# Patient Record
Sex: FEMALE | Race: BLACK OR AFRICAN AMERICAN | NOT HISPANIC OR LATINO | Employment: FULL TIME | URBAN - METROPOLITAN AREA
[De-identification: names, ages, dates, MRNs, and addresses within clinical notes are randomized per-mention and may not be internally consistent; named-entity substitution may affect disease eponyms.]

---

## 2021-03-18 ENCOUNTER — PATIENT MESSAGE (OUTPATIENT)
Dept: ADMINISTRATIVE | Facility: CLINIC | Age: 36
End: 2021-03-18

## 2021-03-30 ENCOUNTER — IMMUNIZATION (OUTPATIENT)
Dept: PRIMARY CARE CLINIC | Facility: CLINIC | Age: 36
End: 2021-03-30
Payer: MEDICAID

## 2021-03-30 DIAGNOSIS — Z23 NEED FOR VACCINATION: Primary | ICD-10-CM

## 2021-03-30 PROCEDURE — 0001A COVID-19, MRNA, LNP-S, PF, 30 MCG/0.3 ML DOSE VACCINE: CPT | Mod: CV19,S$GLB,, | Performed by: FAMILY MEDICINE

## 2021-03-30 PROCEDURE — 91300 COVID-19, MRNA, LNP-S, PF, 30 MCG/0.3 ML DOSE VACCINE: ICD-10-PCS | Mod: S$GLB,,, | Performed by: FAMILY MEDICINE

## 2021-03-30 PROCEDURE — 91300 COVID-19, MRNA, LNP-S, PF, 30 MCG/0.3 ML DOSE VACCINE: CPT | Mod: S$GLB,,, | Performed by: FAMILY MEDICINE

## 2021-03-30 PROCEDURE — 0001A COVID-19, MRNA, LNP-S, PF, 30 MCG/0.3 ML DOSE VACCINE: ICD-10-PCS | Mod: CV19,S$GLB,, | Performed by: FAMILY MEDICINE

## 2021-04-20 ENCOUNTER — IMMUNIZATION (OUTPATIENT)
Dept: PRIMARY CARE CLINIC | Facility: CLINIC | Age: 36
End: 2021-04-20

## 2021-04-20 DIAGNOSIS — Z23 NEED FOR VACCINATION: Primary | ICD-10-CM

## 2021-04-20 PROCEDURE — 91300 COVID-19, MRNA, LNP-S, PF, 30 MCG/0.3 ML DOSE VACCINE: ICD-10-PCS | Mod: S$GLB,,, | Performed by: FAMILY MEDICINE

## 2021-04-20 PROCEDURE — 91300 COVID-19, MRNA, LNP-S, PF, 30 MCG/0.3 ML DOSE VACCINE: CPT | Mod: S$GLB,,, | Performed by: FAMILY MEDICINE

## 2021-04-20 PROCEDURE — 0002A COVID-19, MRNA, LNP-S, PF, 30 MCG/0.3 ML DOSE VACCINE: ICD-10-PCS | Mod: CV19,S$GLB,, | Performed by: FAMILY MEDICINE

## 2021-04-20 PROCEDURE — 0002A COVID-19, MRNA, LNP-S, PF, 30 MCG/0.3 ML DOSE VACCINE: CPT | Mod: CV19,S$GLB,, | Performed by: FAMILY MEDICINE

## 2022-12-08 ENCOUNTER — HOSPITAL ENCOUNTER (EMERGENCY)
Facility: HOSPITAL | Age: 37
Discharge: HOME | End: 2022-12-08
Attending: EMERGENCY MEDICINE
Payer: MEDICAID

## 2022-12-08 VITALS
OXYGEN SATURATION: 99 % | BODY MASS INDEX: 19.49 KG/M2 | SYSTOLIC BLOOD PRESSURE: 142 MMHG | HEART RATE: 81 BPM | TEMPERATURE: 98 F | WEIGHT: 110 LBS | RESPIRATION RATE: 16 BRPM | HEIGHT: 63 IN | DIASTOLIC BLOOD PRESSURE: 97 MMHG

## 2022-12-08 DIAGNOSIS — K52.9 ENTERITIS: Primary | ICD-10-CM

## 2022-12-08 LAB
ALBUMIN SERPL BCP-MCNC: 4.2 G/DL (ref 3.5–5.2)
ALP SERPL-CCNC: 58 U/L (ref 55–135)
ALT SERPL W/O P-5'-P-CCNC: 14 U/L (ref 10–44)
ANION GAP SERPL CALC-SCNC: 5 MMOL/L (ref 8–16)
AST SERPL-CCNC: 20 U/L (ref 10–40)
B-HCG UR QL: NEGATIVE
BACTERIA #/AREA URNS HPF: NEGATIVE /HPF
BASOPHILS # BLD AUTO: 0.05 K/UL (ref 0–0.2)
BASOPHILS NFR BLD: 0.4 % (ref 0–1.9)
BILIRUB SERPL-MCNC: 0.7 MG/DL (ref 0.1–1)
BILIRUB UR QL STRIP: NEGATIVE
BUN SERPL-MCNC: 15 MG/DL (ref 6–20)
CALCIUM SERPL-MCNC: 9 MG/DL (ref 8.7–10.5)
CHLORIDE SERPL-SCNC: 108 MMOL/L (ref 95–110)
CLARITY UR: CLEAR
CO2 SERPL-SCNC: 25 MMOL/L (ref 23–29)
COLOR UR: YELLOW
CREAT SERPL-MCNC: 0.8 MG/DL (ref 0.5–1.4)
CTP QC/QA: YES
DIFFERENTIAL METHOD: ABNORMAL
EOSINOPHIL # BLD AUTO: 0 K/UL (ref 0–0.5)
EOSINOPHIL NFR BLD: 0.2 % (ref 0–8)
ERYTHROCYTE [DISTWIDTH] IN BLOOD BY AUTOMATED COUNT: 14.1 % (ref 11.5–14.5)
EST. GFR  (NO RACE VARIABLE): >60 ML/MIN/1.73 M^2
GLUCOSE SERPL-MCNC: 102 MG/DL (ref 70–110)
GLUCOSE UR QL STRIP: NEGATIVE
HCT VFR BLD AUTO: 39.6 % (ref 37–48.5)
HGB BLD-MCNC: 12.7 G/DL (ref 12–16)
HGB UR QL STRIP: ABNORMAL
HYALINE CASTS #/AREA URNS LPF: 23 /LPF
IMM GRANULOCYTES # BLD AUTO: 0.06 K/UL (ref 0–0.04)
IMM GRANULOCYTES NFR BLD AUTO: 0.4 % (ref 0–0.5)
KETONES UR QL STRIP: ABNORMAL
LEUKOCYTE ESTERASE UR QL STRIP: NEGATIVE
LIPASE SERPL-CCNC: 28 U/L (ref 4–60)
LYMPHOCYTES # BLD AUTO: 1.2 K/UL (ref 1–4.8)
LYMPHOCYTES NFR BLD: 8.6 % (ref 18–48)
MCH RBC QN AUTO: 28.2 PG (ref 27–31)
MCHC RBC AUTO-ENTMCNC: 32.1 G/DL (ref 32–36)
MCV RBC AUTO: 88 FL (ref 82–98)
MICROSCOPIC COMMENT: ABNORMAL
MONOCYTES # BLD AUTO: 0.8 K/UL (ref 0.3–1)
MONOCYTES NFR BLD: 5.9 % (ref 4–15)
NEUTROPHILS # BLD AUTO: 11.4 K/UL (ref 1.8–7.7)
NEUTROPHILS NFR BLD: 84.5 % (ref 38–73)
NITRITE UR QL STRIP: NEGATIVE
NRBC BLD-RTO: 0 /100 WBC
PH UR STRIP: 6 [PH] (ref 5–8)
PLATELET # BLD AUTO: 204 K/UL (ref 150–450)
PMV BLD AUTO: 9.5 FL (ref 9.2–12.9)
POTASSIUM SERPL-SCNC: 3.6 MMOL/L (ref 3.5–5.1)
PROT SERPL-MCNC: 7.6 G/DL (ref 6–8.4)
PROT UR QL STRIP: ABNORMAL
RBC # BLD AUTO: 4.5 M/UL (ref 4–5.4)
RBC #/AREA URNS HPF: 6 /HPF (ref 0–4)
SODIUM SERPL-SCNC: 138 MMOL/L (ref 136–145)
SP GR UR STRIP: 1.03 (ref 1–1.03)
SQUAMOUS #/AREA URNS HPF: 8 /HPF
URN SPEC COLLECT METH UR: ABNORMAL
UROBILINOGEN UR STRIP-ACNC: NEGATIVE EU/DL
WBC # BLD AUTO: 13.45 K/UL (ref 3.9–12.7)
WBC #/AREA URNS HPF: 4 /HPF (ref 0–5)

## 2022-12-08 PROCEDURE — 96372 THER/PROPH/DIAG INJ SC/IM: CPT | Performed by: NURSE PRACTITIONER

## 2022-12-08 PROCEDURE — 80053 COMPREHEN METABOLIC PANEL: CPT | Performed by: NURSE PRACTITIONER

## 2022-12-08 PROCEDURE — 85025 COMPLETE CBC W/AUTO DIFF WBC: CPT | Performed by: NURSE PRACTITIONER

## 2022-12-08 PROCEDURE — 83690 ASSAY OF LIPASE: CPT | Performed by: NURSE PRACTITIONER

## 2022-12-08 PROCEDURE — 63600175 PHARM REV CODE 636 W HCPCS: Performed by: NURSE PRACTITIONER

## 2022-12-08 PROCEDURE — 25000003 PHARM REV CODE 250: Performed by: NURSE PRACTITIONER

## 2022-12-08 PROCEDURE — 81001 URINALYSIS AUTO W/SCOPE: CPT | Performed by: NURSE PRACTITIONER

## 2022-12-08 PROCEDURE — 99285 EMERGENCY DEPT VISIT HI MDM: CPT | Mod: 25

## 2022-12-08 PROCEDURE — 81025 URINE PREGNANCY TEST: CPT | Performed by: NURSE PRACTITIONER

## 2022-12-08 PROCEDURE — 25500020 PHARM REV CODE 255: Performed by: EMERGENCY MEDICINE

## 2022-12-08 RX ORDER — MAG HYDROX/ALUMINUM HYD/SIMETH 200-200-20
30 SUSPENSION, ORAL (FINAL DOSE FORM) ORAL ONCE
Status: COMPLETED | OUTPATIENT
Start: 2022-12-08 | End: 2022-12-08

## 2022-12-08 RX ORDER — DICYCLOMINE HYDROCHLORIDE 20 MG/1
20 TABLET ORAL 2 TIMES DAILY PRN
Qty: 20 TABLET | Refills: 0 | Status: SHIPPED | OUTPATIENT
Start: 2022-12-08 | End: 2022-12-18

## 2022-12-08 RX ORDER — CIPROFLOXACIN 500 MG/1
500 TABLET ORAL 2 TIMES DAILY
Qty: 14 TABLET | Refills: 0 | Status: SHIPPED | OUTPATIENT
Start: 2022-12-08 | End: 2022-12-15

## 2022-12-08 RX ORDER — METRONIDAZOLE 250 MG/1
500 TABLET ORAL
Status: COMPLETED | OUTPATIENT
Start: 2022-12-08 | End: 2022-12-08

## 2022-12-08 RX ORDER — TRAMADOL HYDROCHLORIDE 50 MG/1
50 TABLET ORAL
COMMUNITY
Start: 2022-08-29

## 2022-12-08 RX ORDER — DICYCLOMINE HYDROCHLORIDE 10 MG/ML
20 INJECTION INTRAMUSCULAR
Status: COMPLETED | OUTPATIENT
Start: 2022-12-08 | End: 2022-12-08

## 2022-12-08 RX ORDER — CIPROFLOXACIN 500 MG/1
500 TABLET ORAL
Status: COMPLETED | OUTPATIENT
Start: 2022-12-08 | End: 2022-12-08

## 2022-12-08 RX ORDER — METRONIDAZOLE 500 MG/1
500 TABLET ORAL 3 TIMES DAILY
Qty: 21 TABLET | Refills: 0 | Status: SHIPPED | OUTPATIENT
Start: 2022-12-08 | End: 2022-12-15

## 2022-12-08 RX ORDER — LIDOCAINE HYDROCHLORIDE 20 MG/ML
15 SOLUTION OROPHARYNGEAL ONCE
Status: DISCONTINUED | OUTPATIENT
Start: 2022-12-08 | End: 2022-12-08 | Stop reason: HOSPADM

## 2022-12-08 RX ADMIN — ALUMINUM HYDROXIDE, MAGNESIUM HYDROXIDE, AND SIMETHICONE 30 ML: 200; 200; 20 SUSPENSION ORAL at 06:12

## 2022-12-08 RX ADMIN — CIPROFLOXACIN 500 MG: 500 TABLET, FILM COATED ORAL at 07:12

## 2022-12-08 RX ADMIN — METRONIDAZOLE 500 MG: 250 TABLET ORAL at 07:12

## 2022-12-08 RX ADMIN — DICYCLOMINE HYDROCHLORIDE 20 MG: 10 INJECTION, SOLUTION INTRAMUSCULAR at 06:12

## 2022-12-08 RX ADMIN — IOHEXOL 100 ML: 350 INJECTION, SOLUTION INTRAVENOUS at 06:12

## 2022-12-08 NOTE — ED PROVIDER NOTES
"Source of History:  Patient, chart    Chief complaint:  Abdominal Pain (Cramping and sharp pains x2 days)      HPI:  Lorena Green is a 37 y.o. female with no significant medical history presenting with generalized abdominal pain and heavy menstrual cycle.  Patient states she started her menstrual cycle 2 days ago and has been heavy and passing clots since that time.  Patient states pain initially started in lower abdomen but now radiates to whole abdomen.  Patient denies any associated nausea or vomiting.    This is the extent to the patients complaints today here in the emergency department.    ROS: As per HPI and below:  Constitutional: No fever.  No chills.  Eyes: No visual changes.  ENT: No sore throat. No ear pain    Cardiovascular: No chest pain.  Respiratory: No shortness of breath.  GI:  Positive for abdominal pain.  No nausea.  No vomiting.  Genitourinary:  Positive for vaginal bleeding.  Neurologic: No headache. No focal weakness.  No numbness.  MSK: no back pain.  Integument: No rashes or lesions.  Hematologic: No easy bruising.  Endocrine: No excessive thirst or urination.    Review of patient's allergies indicates:  No Known Allergies    PMH:  As per HPI and below:  No past medical history on file.  No past surgical history on file.    Social History     Tobacco Use    Smoking status: Never   Substance Use Topics    Alcohol use: Yes     Comment: rarely       Physical Exam:    BP (!) 142/97   Pulse 81   Temp 98.1 °F (36.7 °C) (Oral)   Resp 16   Ht 5' 3" (1.6 m)   Wt 49.9 kg (110 lb)   SpO2 99%   Breastfeeding No   BMI 19.49 kg/m²   Nursing note and vital signs reviewed.  Constitutional: No acute distress.  Nontoxic  Eyes: No conjunctival injection.  Extraocular muscles are intact.  ENT: Oropharynx clear.  Normal phonation.  Cardiovascular: Regular rate and rhythm.  No murmurs. No gallops. No rubs  Respiratory: Clear to auscultation bilaterally.  Good air movement.  No wheezes.  No rhonchi. No " rales. No accessory muscle use.  Abdomen:  Abdomen is soft and nontender.  No rebound or guarding.  Bowel sounds within normal limits.  Non peritoneal.  Musculoskeletal: Good range of motion all joints.  No deformities.  Neck supple.  No meningismus.  Skin: No rashes seen.  Good turgor.  No abrasions.  No ecchymoses.  Neuro: alert and oriented x3,  no focal neurological deficits.  Psych: Appropriate, conversant    Labs/Imaging that have been ordered have been independently reviewed and interpreted by myself.    Labs Reviewed   CBC W/ AUTO DIFFERENTIAL - Abnormal; Notable for the following components:       Result Value    WBC 13.45 (*)     Gran # (ANC) 11.4 (*)     Immature Grans (Abs) 0.06 (*)     Gran % 84.5 (*)     Lymph % 8.6 (*)     All other components within normal limits   COMPREHENSIVE METABOLIC PANEL - Abnormal; Notable for the following components:    Anion Gap 5 (*)     All other components within normal limits   URINALYSIS, REFLEX TO URINE CULTURE - Abnormal; Notable for the following components:    Protein, UA Trace (*)     Ketones, UA Trace (*)     Occult Blood UA 2+ (*)     All other components within normal limits    Narrative:     Specimen Source->Urine   URINALYSIS MICROSCOPIC - Abnormal; Notable for the following components:    RBC, UA 6 (*)     Hyaline Casts, UA 23 (*)     All other components within normal limits    Narrative:     Specimen Source->Urine   LIPASE   POCT URINE PREGNANCY     Imaging Results              CT Abdomen Pelvis With Contrast (Final result)  Result time 12/08/22 18:36:12      Final result by Brandan Krishnan MD (12/08/22 18:36:12)                   Narrative:    CT ABDOMEN AND PELVIS WITH CONTRAST    HISTORY: Abdominal pain. Comparison to June 2019.    CMS MANDATED QUALITY DATA - CT RADIATION  436    All CT scans at this facility utilize dose modulation, iterative reconstruction, and/or weight based dosing when appropriate to reduce radiation dose to as low as  reasonably achievable    FINDINGS:    Post infusion images were obtained from the lung bases to the pubic symphysis. 100 cc nonionic contrast was administered for the examination.    The lung bases are unremarkable.    There is mild hepatic steatosis. Ill-defined hypoattenuation anteriorly in the left hepatic lobe near the falciform ligament is consistent with perfusion variant, of no acute significance. The gallbladder and biliary tree are unremarkable. The spleen is normal in size and appearance. The pancreas, adrenal glands, and right kidney are normal. Subcentimeter cyst is noted at the mid pole on the left. The abdominal aorta is normal in caliber.    The appendix is visualized and is normal. There is no pathologic bowel wall thickening. No free air or free fluid is identified.    Images of the pelvis demonstrate mild wall thickening of small bowel segments, with slightly greater than normal wall enhancement. The appearance suggests mild nonspecific enteritis. The urinary bladder is unremarkable.    Multiple prominent bilateral adnexal veins are noted, as can be seen with pelvic congestion syndrome.    IMPRESSION:      1. Slight wall thickening of small bowel segments in the pelvis suggesting mild nonspecific enteritis.  2. Normal appendix.  3. Multiple mildly prominent bilateral adnexal veins are noted. This appearance can be seen with pelvic congestion syndrome.    Electronically signed by:  Brandan Krishnan MD  12/8/2022 6:36 PM CST Workstation: [id number]                                     US Pelvis Complete Non OB (Final result)  Result time 12/08/22 17:28:02      Final result by Brandan Krishnan MD (12/08/22 17:28:02)                   Narrative:    Pelvic ultrasound transabdominal    CLINICAL DATA: Negative urine pregnancy test, pelvic pain, heavy bleeding    FINDINGS: Sonographic assessment of the pelvis was performed utilizing transabdominal technique. The uterus measures 8.5 x 4.3 x 5.0 cm. The  endometrium is normal in thickness at 5 mm. No uterine masses are identified.    The bilateral ovaries are normal in appearance, with arterial flow demonstrated. There is no free fluid.    IMPRESSION:  1. Normal transabdominal pelvic ultrasound.    Electronically signed by:  Brandan Krishnan MD  12/8/2022 5:28 PM CST Workstation: [id number]                                    MDM/ Differential Dx:   Emergent evaluation of a 36 yo female presenting for heavy vaginal bleeding over the past 2 days.  Patient states she initially began with lower abdominal pain that now radiates to her upper abdomen.  Patient states she started her menstrual cycle 2 days ago and bleeding has been heavier than normal.  Patient denies any associated nausea or vomiting.  On exam pt is A&Ox3. VSS. Nonfebrile and nontoxic appearing.  Mucous membranes pink and moist.  Breath sounds clear bilaterally.  Abdomen soft and nontender. No rebound or guarding appreciated on exam.   BS WNL.  Pt speaking in full sentences.  Steady gait appreciated. Cap refill < 3 seconds.      Differential diagnoses include but are not limited to UTI, pyelonephritis, gastritis, gastroenteritis, PID, STD, ovarian cyst, GERD, others.      I will get labs, imaging and reassess.  I discussed this case with my supervising physician.    ED Course as of 12/08/22 1937   Thu Dec 08, 2022   1715 CBC unremarkable.  Slight leukocytosis noted at 13.45.  CMP unremarkable.  UA negative for any acute infectious process.  Lipase negative.  Urine preg negative.  Awaiting ultrasound. [RZ]   1758 Ultrasound negative for any acute abnormality.  Patient reassessed.  Patient states continued pain and upper epigastric region.  Will get CT to rule out any acute infectious process.  Will give GI cocktail and Bentyl to help with pain.  Awaiting imaging. [RZ]   1838 CT concerning for possible enteritis.  Will treat patient with antibiotics.  [RZ]   1936 Patient reassessed.  Patient updated on lab  and imaging results.  Advised will give antibiotics for colitis.  Advised to increase fluids and bland diet.  Strict return to ED precautions discussed.  Patient verbalized understanding of this plan of care.  All questions and concerns addressed. [RZ]   1937 Patient is hemodynamically stable, vital signs are normal. Discharge instructions given. Return to ED precautions discussed. Follow up as directed. Pt verbalized understanding of this plan.  Pt is stable for discharge. [RZ]      ED Course User Index  [RZ] Rose Marie Ash NP               Diagnostic Impression:    1. Enteritis         ED Disposition Condition    Discharge Stable            ED Prescriptions       Medication Sig Dispense Start Date End Date Auth. Provider    ciprofloxacin HCl (CIPRO) 500 MG tablet Take 1 tablet (500 mg total) by mouth 2 (two) times daily. for 7 days 14 tablet 12/8/2022 12/15/2022 Rose Marie Ash NP    metroNIDAZOLE (FLAGYL) 500 MG tablet Take 1 tablet (500 mg total) by mouth 3 (three) times daily. for 7 days 21 tablet 12/8/2022 12/15/2022 Rose Marie Ash NP    dicyclomine (BENTYL) 20 mg tablet Take 1 tablet (20 mg total) by mouth 2 (two) times daily as needed (pain). 20 tablet 12/8/2022 12/18/2022 Rose Marie Ash NP          Follow-up Information       Follow up With Specialties Details Why Contact Info    PCP  Schedule an appointment as soon as possible for a visit  As needed                Rose Marie Ash NP  12/08/22 1937

## 2022-12-09 NOTE — DISCHARGE INSTRUCTIONS
You were seen and evaluated in the ER today.  Your workup while you were here is a mild infection in your intestine.  We are going to treat you with antibiotics.  We are also going to prescribe you medication to help with pain.  Please increase fluids and bland diet.  Please follow-up with your PCP as needed.  Please return to the ED for any worsening symptoms such as chest pain, shortness of breath, fever not controlled with Tylenol or ibuprofen or uncontrolled pain.      Our goal in the emergency department is to always give you outstanding care and exceptional service. You may receive a survey by mail or e-mail in the next week regarding your experience in our ED. We would greatly appreciate your completing and returning the survey. Your feedback provides us with a way to recognize our staff who give very good care and it helps us learn how to improve when your experience was below our aspiration of excellence.

## 2023-02-27 NOTE — Clinical Note
"Lorena"Sena Green was seen and treated in our emergency department on 12/8/2022.  She may return to work on 12/10/2022.       If you have any questions or concerns, please don't hesitate to call.      Rose Marie Ash NP" Zaid Gupta (MD)  Internal Medicine  [address] [place] [address]  Phone: [phone]  Fax: [phone]  Follow Up Time: 1-3 Days

## 2023-09-19 LAB
ABO + RH BLD: NORMAL
C TRACH RRNA SPEC QL PROBE: NORMAL
GONORRHEA: NORMAL
HBV SURFACE AG SERPL QL IA: NEGATIVE
HCT VFR BLD AUTO: 31 % (ref 36–46)
HGB BLD-MCNC: 10 G/DL (ref 12–16)
HIV 1+2 AB+HIV1 P24 AG SERPL QL IA: NEGATIVE
RPR: POSITIVE
RUBELLA IMMUNE STATUS: NORMAL

## 2023-10-24 DIAGNOSIS — O30.009: Primary | ICD-10-CM

## 2023-10-24 DIAGNOSIS — O09.40: Primary | ICD-10-CM

## 2023-11-06 ENCOUNTER — HOSPITAL ENCOUNTER (OUTPATIENT)
Facility: HOSPITAL | Age: 38
Discharge: HOME | End: 2023-11-06
Attending: SPECIALIST | Admitting: SPECIALIST
Payer: MEDICAID

## 2023-11-06 VITALS — SYSTOLIC BLOOD PRESSURE: 140 MMHG | HEART RATE: 100 BPM | DIASTOLIC BLOOD PRESSURE: 96 MMHG

## 2023-11-06 DIAGNOSIS — O30.009: ICD-10-CM

## 2023-11-06 DIAGNOSIS — O09.40: ICD-10-CM

## 2023-11-06 PROCEDURE — 59025 FETAL NON-STRESS TEST: CPT

## 2023-11-06 NOTE — PROGRESS NOTES
11/06/23 1007   Non Stress Test - General   $ NST Procedure Charge Completed   Indications/Pt Reported Complaint AMA/Multiple   Test Duration (min) 30 min   Number of Fetuses 2   Nonstress Test Baby A   Variability Moderate   Decels None   Accels Present   Baseline    Interpretation Baby A   Nonstress Test Interpretation Reactive   Overall Impression Reassuring   Comments Reviewed by Dr. Larson   Nonstress Test Baby B   Variability Moderate   Decels Variable   Accels Present   Baseline    Interpretation Baby B   Nonstress Test Interpretation Reactive   Overall Impression Reassuring   Comments Reivewed by Dr. Larson     NST scheduled for Thursday. Pt was counseled to avoid coffee prior to next NST. Verbalized understanding.

## 2023-11-09 ENCOUNTER — HOSPITAL ENCOUNTER (OUTPATIENT)
Facility: HOSPITAL | Age: 38
Discharge: HOME | End: 2023-11-09
Attending: SPECIALIST | Admitting: SPECIALIST
Payer: MEDICAID

## 2023-11-09 VITALS — DIASTOLIC BLOOD PRESSURE: 77 MMHG | HEART RATE: 73 BPM | RESPIRATION RATE: 18 BRPM | SYSTOLIC BLOOD PRESSURE: 130 MMHG

## 2023-11-09 DIAGNOSIS — O30.002 TWIN GESTATION IN SECOND TRIMESTER: Primary | ICD-10-CM

## 2023-11-09 DIAGNOSIS — O30.009 PREGNANCY, TWINS: ICD-10-CM

## 2023-11-09 PROCEDURE — 59025 FETAL NON-STRESS TEST: CPT

## 2023-11-13 ENCOUNTER — HOSPITAL ENCOUNTER (OUTPATIENT)
Facility: HOSPITAL | Age: 38
Discharge: HOME | End: 2023-11-13
Attending: SPECIALIST | Admitting: SPECIALIST
Payer: MEDICAID

## 2023-11-13 VITALS — RESPIRATION RATE: 17 BRPM | SYSTOLIC BLOOD PRESSURE: 139 MMHG | HEART RATE: 81 BPM | DIASTOLIC BLOOD PRESSURE: 88 MMHG

## 2023-11-13 DIAGNOSIS — O30.002 TWIN GESTATION IN SECOND TRIMESTER: ICD-10-CM

## 2023-11-13 LAB — FIBRONECTIN FETAL SPEC QL: NEGATIVE

## 2023-11-13 PROCEDURE — 82731 ASSAY OF FETAL FIBRONECTIN: CPT | Performed by: SPECIALIST

## 2023-11-16 ENCOUNTER — HOSPITAL ENCOUNTER (OUTPATIENT)
Facility: HOSPITAL | Age: 38
Discharge: HOME | End: 2023-11-16
Attending: SPECIALIST | Admitting: SPECIALIST
Payer: MEDICAID

## 2023-11-16 VITALS — SYSTOLIC BLOOD PRESSURE: 136 MMHG | HEART RATE: 77 BPM | DIASTOLIC BLOOD PRESSURE: 85 MMHG

## 2023-11-16 DIAGNOSIS — O30.002 TWIN GESTATION IN SECOND TRIMESTER: ICD-10-CM

## 2023-11-16 LAB
ALBUMIN SERPL BCP-MCNC: 3.6 G/DL (ref 3.5–5.2)
ALP SERPL-CCNC: 109 U/L (ref 55–135)
ALT SERPL W/O P-5'-P-CCNC: 9 U/L (ref 10–44)
ANION GAP SERPL CALC-SCNC: 6 MMOL/L (ref 8–16)
AST SERPL-CCNC: 19 U/L (ref 10–40)
BASOPHILS # BLD AUTO: 0.02 K/UL (ref 0–0.2)
BASOPHILS NFR BLD: 0.3 % (ref 0–1.9)
BILIRUB SERPL-MCNC: 0.3 MG/DL (ref 0.1–1)
BILIRUB UR QL STRIP: NEGATIVE
BUN SERPL-MCNC: 6 MG/DL (ref 6–20)
CALCIUM SERPL-MCNC: 8.9 MG/DL (ref 8.7–10.5)
CHLORIDE SERPL-SCNC: 104 MMOL/L (ref 95–110)
CLARITY UR: CLEAR
CO2 SERPL-SCNC: 25 MMOL/L (ref 23–29)
COLOR UR: YELLOW
CREAT SERPL-MCNC: 0.5 MG/DL (ref 0.5–1.4)
DIFFERENTIAL METHOD: ABNORMAL
EOSINOPHIL # BLD AUTO: 0 K/UL (ref 0–0.5)
EOSINOPHIL NFR BLD: 0.6 % (ref 0–8)
ERYTHROCYTE [DISTWIDTH] IN BLOOD BY AUTOMATED COUNT: 19.4 % (ref 11.5–14.5)
EST. GFR  (NO RACE VARIABLE): >60 ML/MIN/1.73 M^2
GLUCOSE SERPL-MCNC: 95 MG/DL (ref 70–110)
GLUCOSE UR QL STRIP: NEGATIVE
HCT VFR BLD AUTO: 35.7 % (ref 37–48.5)
HGB BLD-MCNC: 11.1 G/DL (ref 12–16)
HGB UR QL STRIP: NEGATIVE
IMM GRANULOCYTES # BLD AUTO: 0.05 K/UL (ref 0–0.04)
IMM GRANULOCYTES NFR BLD AUTO: 0.7 % (ref 0–0.5)
KETONES UR QL STRIP: NEGATIVE
LEUKOCYTE ESTERASE UR QL STRIP: NEGATIVE
LYMPHOCYTES # BLD AUTO: 1.7 K/UL (ref 1–4.8)
LYMPHOCYTES NFR BLD: 24.5 % (ref 18–48)
MCH RBC QN AUTO: 28.2 PG (ref 27–31)
MCHC RBC AUTO-ENTMCNC: 31.1 G/DL (ref 32–36)
MCV RBC AUTO: 91 FL (ref 82–98)
MONOCYTES # BLD AUTO: 0.7 K/UL (ref 0.3–1)
MONOCYTES NFR BLD: 9.2 % (ref 4–15)
NEUTROPHILS # BLD AUTO: 4.6 K/UL (ref 1.8–7.7)
NEUTROPHILS NFR BLD: 64.7 % (ref 38–73)
NITRITE UR QL STRIP: NEGATIVE
NRBC BLD-RTO: 0 /100 WBC
PH UR STRIP: 6 [PH] (ref 5–8)
PLATELET # BLD AUTO: 149 K/UL (ref 150–450)
PMV BLD AUTO: 9.4 FL (ref 9.2–12.9)
POTASSIUM SERPL-SCNC: 3.8 MMOL/L (ref 3.5–5.1)
PROT SERPL-MCNC: 6.5 G/DL (ref 6–8.4)
PROT UR QL STRIP: ABNORMAL
RBC # BLD AUTO: 3.94 M/UL (ref 4–5.4)
SODIUM SERPL-SCNC: 135 MMOL/L (ref 136–145)
SP GR UR STRIP: 1.02 (ref 1–1.03)
URN SPEC COLLECT METH UR: ABNORMAL
UROBILINOGEN UR STRIP-ACNC: NEGATIVE EU/DL
WBC # BLD AUTO: 7.09 K/UL (ref 3.9–12.7)

## 2023-11-16 PROCEDURE — 85025 COMPLETE CBC W/AUTO DIFF WBC: CPT | Performed by: SPECIALIST

## 2023-11-16 PROCEDURE — 80053 COMPREHEN METABOLIC PANEL: CPT | Performed by: SPECIALIST

## 2023-11-16 PROCEDURE — 36415 COLL VENOUS BLD VENIPUNCTURE: CPT | Performed by: SPECIALIST

## 2023-11-16 PROCEDURE — 59025 FETAL NON-STRESS TEST: CPT

## 2023-11-16 PROCEDURE — 25000003 PHARM REV CODE 250: Performed by: SPECIALIST

## 2023-11-16 PROCEDURE — 81003 URINALYSIS AUTO W/O SCOPE: CPT | Performed by: SPECIALIST

## 2023-11-16 RX ORDER — ACETAMINOPHEN 325 MG/1
325 TABLET ORAL ONCE
Status: COMPLETED | OUTPATIENT
Start: 2023-11-16 | End: 2023-11-16

## 2023-11-16 RX ADMIN — ACETAMINOPHEN 325 MG: 325 TABLET ORAL at 12:11

## 2023-11-17 ENCOUNTER — HOSPITAL ENCOUNTER (OUTPATIENT)
Facility: HOSPITAL | Age: 38
Discharge: HOME | End: 2023-11-17
Attending: SPECIALIST | Admitting: GENERAL PRACTICE
Payer: MEDICAID

## 2023-11-17 VITALS
BODY MASS INDEX: 23.21 KG/M2 | RESPIRATION RATE: 18 BRPM | SYSTOLIC BLOOD PRESSURE: 130 MMHG | TEMPERATURE: 98 F | HEIGHT: 63 IN | HEART RATE: 93 BPM | DIASTOLIC BLOOD PRESSURE: 86 MMHG | WEIGHT: 131 LBS

## 2023-11-17 DIAGNOSIS — R10.9 ABDOMINAL PAIN: ICD-10-CM

## 2023-11-17 LAB
ABO + RH BLD: NORMAL
ALBUMIN SERPL BCP-MCNC: 3.6 G/DL (ref 3.5–5.2)
ALP SERPL-CCNC: 112 U/L (ref 55–135)
ALT SERPL W/O P-5'-P-CCNC: 9 U/L (ref 10–44)
ANION GAP SERPL CALC-SCNC: 10 MMOL/L (ref 8–16)
AST SERPL-CCNC: 20 U/L (ref 10–40)
BACTERIA #/AREA URNS HPF: ABNORMAL /HPF
BASOPHILS # BLD AUTO: 0.03 K/UL (ref 0–0.2)
BASOPHILS NFR BLD: 0.4 % (ref 0–1.9)
BILIRUB SERPL-MCNC: 0.3 MG/DL (ref 0.1–1)
BILIRUB UR QL STRIP: NEGATIVE
BLD GP AB SCN CELLS X3 SERPL QL: NORMAL
BUN SERPL-MCNC: 6 MG/DL (ref 6–20)
CALCIUM SERPL-MCNC: 9.1 MG/DL (ref 8.7–10.5)
CHLORIDE SERPL-SCNC: 105 MMOL/L (ref 95–110)
CLARITY UR: ABNORMAL
CLUE CELLS VAG QL WET PREP: ABNORMAL
CO2 SERPL-SCNC: 20 MMOL/L (ref 23–29)
COLOR UR: YELLOW
CREAT SERPL-MCNC: 0.5 MG/DL (ref 0.5–1.4)
CREAT UR-MCNC: 135.7 MG/DL (ref 15–325)
DIFFERENTIAL METHOD: ABNORMAL
EOSINOPHIL # BLD AUTO: 0 K/UL (ref 0–0.5)
EOSINOPHIL NFR BLD: 0.4 % (ref 0–8)
ERYTHROCYTE [DISTWIDTH] IN BLOOD BY AUTOMATED COUNT: 19.2 % (ref 11.5–14.5)
EST. GFR  (NO RACE VARIABLE): >60 ML/MIN/1.73 M^2
GLUCOSE SERPL-MCNC: 90 MG/DL (ref 70–110)
GLUCOSE UR QL STRIP: NEGATIVE
GRAM STN SPEC: NORMAL
GRAM STN SPEC: NORMAL
HCT VFR BLD AUTO: 35.7 % (ref 37–48.5)
HGB BLD-MCNC: 11.2 G/DL (ref 12–16)
HGB UR QL STRIP: NEGATIVE
HYALINE CASTS #/AREA URNS LPF: 17 /LPF
IMM GRANULOCYTES # BLD AUTO: 0.04 K/UL (ref 0–0.04)
IMM GRANULOCYTES NFR BLD AUTO: 0.5 % (ref 0–0.5)
KETONES UR QL STRIP: NEGATIVE
LEUKOCYTE ESTERASE UR QL STRIP: ABNORMAL
LYMPHOCYTES # BLD AUTO: 1.6 K/UL (ref 1–4.8)
LYMPHOCYTES NFR BLD: 21.2 % (ref 18–48)
MCH RBC QN AUTO: 28.1 PG (ref 27–31)
MCHC RBC AUTO-ENTMCNC: 31.4 G/DL (ref 32–36)
MCV RBC AUTO: 90 FL (ref 82–98)
MICROSCOPIC COMMENT: ABNORMAL
MONOCYTES # BLD AUTO: 0.7 K/UL (ref 0.3–1)
MONOCYTES NFR BLD: 9.2 % (ref 4–15)
NEUTROPHILS # BLD AUTO: 5.2 K/UL (ref 1.8–7.7)
NEUTROPHILS NFR BLD: 68.3 % (ref 38–73)
NITRITE UR QL STRIP: NEGATIVE
NRBC BLD-RTO: 0 /100 WBC
PH UR STRIP: 7 [PH] (ref 5–8)
PLATELET # BLD AUTO: 151 K/UL (ref 150–450)
PMV BLD AUTO: 9.2 FL (ref 9.2–12.9)
POTASSIUM SERPL-SCNC: 3.6 MMOL/L (ref 3.5–5.1)
PROT SERPL-MCNC: 6.6 G/DL (ref 6–8.4)
PROT UR QL STRIP: ABNORMAL
PROT UR-MCNC: 18 MG/DL (ref 6–15)
PROT/CREAT UR: 0.13 MG/G{CREAT} (ref 0–0.2)
RBC # BLD AUTO: 3.99 M/UL (ref 4–5.4)
RBC #/AREA URNS HPF: 2 /HPF (ref 0–4)
SODIUM SERPL-SCNC: 135 MMOL/L (ref 136–145)
SP GR UR STRIP: 1.02 (ref 1–1.03)
SPECIMEN SOURCE: ABNORMAL
SQUAMOUS #/AREA URNS HPF: 16 /HPF
T VAGINALIS GENITAL QL WET PREP: ABNORMAL
URN SPEC COLLECT METH UR: ABNORMAL
UROBILINOGEN UR STRIP-ACNC: NEGATIVE EU/DL
WBC # BLD AUTO: 7.64 K/UL (ref 3.9–12.7)
WBC #/AREA URNS HPF: 7 /HPF (ref 0–5)
YEAST GENITAL QL WET PREP: ABNORMAL

## 2023-11-17 PROCEDURE — 85025 COMPLETE CBC W/AUTO DIFF WBC: CPT | Performed by: GENERAL PRACTICE

## 2023-11-17 PROCEDURE — 81001 URINALYSIS AUTO W/SCOPE: CPT | Performed by: SPECIALIST

## 2023-11-17 PROCEDURE — 99215 PR OFFICE/OUTPT VISIT, EST, LEVL V, 40-54 MIN: ICD-10-PCS | Mod: ,,, | Performed by: GENERAL PRACTICE

## 2023-11-17 PROCEDURE — 87205 SMEAR GRAM STAIN: CPT | Performed by: GENERAL PRACTICE

## 2023-11-17 PROCEDURE — 86901 BLOOD TYPING SEROLOGIC RH(D): CPT | Performed by: GENERAL PRACTICE

## 2023-11-17 PROCEDURE — 36415 COLL VENOUS BLD VENIPUNCTURE: CPT | Performed by: SPECIALIST

## 2023-11-17 PROCEDURE — 82570 ASSAY OF URINE CREATININE: CPT | Performed by: SPECIALIST

## 2023-11-17 PROCEDURE — 86592 SYPHILIS TEST NON-TREP QUAL: CPT | Performed by: GENERAL PRACTICE

## 2023-11-17 PROCEDURE — 99215 OFFICE O/P EST HI 40 MIN: CPT | Mod: ,,, | Performed by: GENERAL PRACTICE

## 2023-11-17 PROCEDURE — 87210 SMEAR WET MOUNT SALINE/INK: CPT | Performed by: GENERAL PRACTICE

## 2023-11-17 PROCEDURE — 25000003 PHARM REV CODE 250: Performed by: GENERAL PRACTICE

## 2023-11-17 PROCEDURE — 80053 COMPREHEN METABOLIC PANEL: CPT | Performed by: GENERAL PRACTICE

## 2023-11-17 RX ORDER — LABETALOL HYDROCHLORIDE 5 MG/ML
80 INJECTION, SOLUTION INTRAVENOUS ONCE AS NEEDED
Status: DISCONTINUED | OUTPATIENT
Start: 2023-11-17 | End: 2023-11-17

## 2023-11-17 RX ORDER — METRONIDAZOLE 250 MG/1
500 TABLET ORAL ONCE
Status: COMPLETED | OUTPATIENT
Start: 2023-11-17 | End: 2023-11-17

## 2023-11-17 RX ORDER — FERROUS GLUCONATE 324(38)MG
225 TABLET ORAL
COMMUNITY

## 2023-11-17 RX ORDER — HYDRALAZINE HYDROCHLORIDE 20 MG/ML
10 INJECTION INTRAMUSCULAR; INTRAVENOUS ONCE AS NEEDED
Status: DISCONTINUED | OUTPATIENT
Start: 2023-11-17 | End: 2023-11-17

## 2023-11-17 RX ORDER — LABETALOL 200 MG/1
200 TABLET, FILM COATED ORAL 2 TIMES DAILY
Qty: 60 TABLET | Refills: 11 | Status: ON HOLD | OUTPATIENT
Start: 2023-11-17 | End: 2023-12-19

## 2023-11-17 RX ORDER — PANTOPRAZOLE SODIUM 40 MG/1
40 TABLET, DELAYED RELEASE ORAL DAILY
COMMUNITY

## 2023-11-17 RX ORDER — DOCUSATE SODIUM 100 MG/1
100 CAPSULE, LIQUID FILLED ORAL 2 TIMES DAILY
COMMUNITY

## 2023-11-17 RX ORDER — METOCLOPRAMIDE 10 MG/1
10 TABLET ORAL ONCE
Status: COMPLETED | OUTPATIENT
Start: 2023-11-17 | End: 2023-11-17

## 2023-11-17 RX ORDER — METOCLOPRAMIDE 10 MG/1
10 TABLET ORAL ONCE
Status: DISCONTINUED | OUTPATIENT
Start: 2023-11-17 | End: 2023-11-17

## 2023-11-17 RX ORDER — NAPROXEN SODIUM 220 MG/1
81 TABLET, FILM COATED ORAL DAILY
COMMUNITY

## 2023-11-17 RX ORDER — LABETALOL HYDROCHLORIDE 5 MG/ML
40 INJECTION, SOLUTION INTRAVENOUS ONCE AS NEEDED
Status: DISCONTINUED | OUTPATIENT
Start: 2023-11-17 | End: 2023-11-17

## 2023-11-17 RX ORDER — METRONIDAZOLE 500 MG/1
500 TABLET ORAL EVERY 12 HOURS
Qty: 13 TABLET | Refills: 0 | Status: SHIPPED | OUTPATIENT
Start: 2023-11-17

## 2023-11-17 RX ORDER — LABETALOL HYDROCHLORIDE 5 MG/ML
20 INJECTION, SOLUTION INTRAVENOUS ONCE AS NEEDED
Status: DISCONTINUED | OUTPATIENT
Start: 2023-11-17 | End: 2023-11-17

## 2023-11-17 RX ORDER — LABETALOL 200 MG/1
200 TABLET, FILM COATED ORAL ONCE
Status: COMPLETED | OUTPATIENT
Start: 2023-11-17 | End: 2023-11-17

## 2023-11-17 RX ADMIN — METOCLOPRAMIDE 10 MG: 10 TABLET ORAL at 05:11

## 2023-11-17 RX ADMIN — LABETALOL HYDROCHLORIDE 200 MG: 200 TABLET, FILM COATED ORAL at 08:11

## 2023-11-17 RX ADMIN — METRONIDAZOLE 500 MG: 250 TABLET ORAL at 08:11

## 2023-11-17 NOTE — LETTER
"Lorena"Sena Green was seen and treated in our emergency department on 11/16/2023.  She may return to work on 11/19/23    If you have any questions or concerns, please don't hesitate to call.      Louise Crowley,RNC  "

## 2023-11-17 NOTE — NURSING
On license of [facility]  Department of Obstetrics and Gynecology  Labor & Delivery Triage Assessment    PATIENT NAME: Lorena Green  MRN: [id number]  TODAY'S DATE: 2023    CHIEF COMPLAINT: Abdominal Pain      OB History    Para Term  AB Living   6 5 3 2 0 0   SAB IAB Ectopic Multiple Live Births   0 0 0 0 0      # Outcome Date GA Lbr Lamin/2nd Weight Sex Delivery Anes PTL Lv   6 Current            5 Term  40w0d  2.92 kg (6 lb 7 oz) F       4   32w0d  1.814 kg (4 lb)        3 Term  40w0d  2.835 kg (6 lb 4 oz) F       2   32w0d  2.268 kg (5 lb) F       1 Term  40w0d  2.92 kg (6 lb 7 oz) F         Past Medical History:   Diagnosis Date    Anemia     GERD (gastroesophageal reflux disease)     Hypertension      Past Surgical History:   Procedure Laterality Date    CYSTECTOMY      right breast cyst removal in  as baby         VITAL SIGNS - ABNORMAL VITALS INCLUDE TEMP >100.4,RR <12 or >26, SUSTAINED MATERNAL PULSE <60 or >120     VITAL SIGNS (Most Recent)  Temp: 98.1 °F (36.7 °C) (23 1539)  Pulse: 82 (23 1642)  Resp: 16 (23 164)  BP: (!) 150/86 (23 164)    VITAL SIGNS     abnormal  HEADACHE    yes     VOMITING   no  VISUAL DISTURBANCES  no  EPIGASTRIC PAIN        no  PROTEINURIA 2+ or MORE             no   EDEMA FACE/EXTREMITIES            no    FETAL MOVEMENT     FETAL MOVEMENT: Active  FETAL HEART RATE BASELINE =  A-140, B-150  normal  FETAL HEART RATE VARIABILITY:  Moderate  FETAL HEART RATE ACCELERATIONS FOR GESTATIONAL AGE: present  FETAL HEART RATE DECELERATIONS: none    ABDOMINAL PAIN/CRAMPING/CONTRACTIONS     Patient is complaining of abdominal pain/cramping/contractions. Contraction pattern is Irregular, less than 5 minutes apart. Contraction strength is mild. Resting tone is relaxed. Abdominal palpation is non-tender.    RUPTURE OF MEMBRANES OR LEAKING OF AMNIOTIC FLUID     Patient denies ROM or leaking of amniotic  fluid.    VAGINAL BLEEDING     Patient denies vaginal bleeding.    VAGINAL EXAM     DILATION:  0  STATION:  0  EFFACEMENT:  0  PRESENTATION:  0    VAGINAL EXAM DEFERRED DUE TO:  Not in Labor    PAIN PRESENT ON ARRIVAL     ONSET:   0900  LOCATION:  Lower abd and head  PAIN SCALE (0-10):  Head is 7  DESCRIPTION: Constant ache in head    Interventions     Reglan po for headache, po hydration, start 24hr urine, continue serial bp's      PATIENT DISPOSITION     Observing at this time      Dr. Awan  notified at 1615 of the above assessment.    Kady Delatorre, RN  [facility]  11/17/2023

## 2023-11-17 NOTE — PROGRESS NOTES
"  Lorena is a 38 y.o. yo  at 29w4d wks who presented to L&D with c/o lower pelvic and epigastric pain since this morning.  Also has a HA since this morning - took 1g tylenol at 0930 and again at 1300hrs.  Says on Monday (today is Friday) she had "fuzzy spots" in her vision.  No blind spots or scotoma.    Thinks she has a yeast infection.  No vaginal burning or itching.  No dysuria or hematuria.  No GI, URI, or febrile illness.    Active FM x 2.  Denies VB or LOF.    Says CVX on MON was thin but closed.    She does not have HTN outside of pregnancy.  Presented for OB care at 23wks and had /86.  No prior pre-eclmpsia or HTN in pregnancy.  New paternity.    OB PROBLEM LIST:  -late entry to care @ 23wks  -di/di twins  -AMA  -h/o syphilis in , treated (was during pregnancy)  -h/o IUFD @ 32wks in   -h/o PTD @ 32wks in       PMH = none  PSH = breast cyst removed as an infant  MEDS =    PNV, iron    Colace    Protonix 40 daily    ASA 81mg daily    NKDA    OB HX =   40wks    32wks    40wks    32wks IUFD,    40wks   Present  -largest baby 6lb 7oz    SOC  HX =  FOB involved, sister present and supportive tonight  Denies tob/etoh/drugs  Works as a     --------------------------------    140's / 80's mostly, also 154/106, 164/102    GEN = alert/oriented, pleasant, sitting up in bed, talking without grimace during contractions  ABD = gravid, nontender, palpates moderate during contraction   = nefg, no lesions, thick white curdy discharge, no malodor, CVX digitally long/closed/posterior    TOCO q-3 min --> spaced out with hydration  FHR reassuring x 2    CT/NG () = neg  fFN () = neg    Lab Date: 2023   WBC 8   HGB 11   HCT 36         Lab Date: 2023   Creatinine 0.5   AST 20   ALT 9      Lab Date: 2023   Spec Grav 1.020   Nitrite -   Leuk Est 1+   Ketones -   Protein trace   RBC/hpf 2   Squames 16   Other -     Spot " pr/cr ratio = 0.13    WET PREP = moderate clue cells present, no yeast/trich    GROWTH US (24 OCT / +1wks):  EFW Baby A = 859g (44%) / MVP 5.16cm / anterior placenta  EFW Baby B = 906g (51%) / MVP 4.96cm / anterior placenta  Cervix 3cm    ---------------------------------    A/P  at 29+4wks with  contractions, recent negative fFN and closed CVX.  Has BV.  BP's elevated today, intermittent severe-range.  Normal labs and HA improved with reglan.  Patient overall feels much better after hydration and reglan.  Requesting to go home.  Reassuring fetal status x 2.    -flagyl one dose tonight then outpatient Rx to complete 7d treatment for BV  -labetalol 200mg po x 1 tonight then outpatient Rx to start BID tomorrow  -d/c home tonight with 24hr urine collection  -return to L&D tomorrow for urine result and BP check  -return sooner juan CARRIZALES MD

## 2023-11-18 ENCOUNTER — HOSPITAL ENCOUNTER (OUTPATIENT)
Facility: HOSPITAL | Age: 38
Discharge: HOME | End: 2023-11-18
Attending: GENERAL PRACTICE | Admitting: SPECIALIST
Payer: MEDICAID

## 2023-11-18 VITALS — SYSTOLIC BLOOD PRESSURE: 145 MMHG | RESPIRATION RATE: 20 BRPM | HEART RATE: 80 BPM | DIASTOLIC BLOOD PRESSURE: 96 MMHG

## 2023-11-18 DIAGNOSIS — O16.9 HYPERTENSION AFFECTING PREGNANCY: ICD-10-CM

## 2023-11-18 LAB
PROT 24H UR-MRATE: 166 MG/SPEC (ref 6–100)
PROT UR-MCNC: 13 MG/DL (ref 0–15)
RPR SER QL: NORMAL
URINE COLLECTION DURATION: 24 HR
URINE VOLUME: 1280 ML

## 2023-11-18 PROCEDURE — 84156 ASSAY OF PROTEIN URINE: CPT | Performed by: GENERAL PRACTICE

## 2023-11-18 NOTE — NURSING
2045 pt discharged home ambulatory in stable condition.   Patient wants to know if something can be called in for a yeast infection? Marcia in [place].

## 2023-11-18 NOTE — DISCHARGE INSTRUCTIONS
Return to hospital if decreased fetal movement, loss of fluid; vaginal bleeding or contractions more than 5-6 in an hour unrelieved by rest and hydration.   and take Labetalol and Flagyl as directed.  Continue 24 hour urine until 3:30 pm on 11/18/23 keep urine jug on ice or in the refrigerator.   Return to labor and delivery on 11/18/23  with completed 24 hour urine and for blood pressure check and fetal monitoring.

## 2023-11-18 NOTE — NURSING
1911 pt sitting up in bed no acute distress noted.  Pt states +FM; denies loss of fluid or vaginal bleeding; pt states having some mild contractions but are not as bad as earlier.  Pt denies HA; blurred vision, chest pain; epigastric pain or SOB at this time.  Assessment done per flow.  Breath sounds clear bilaterally; apical pulse strong and regular; bowel sounds active X 4 quadrants.  Pt AAO x3 call bell in reach.  IV to LH infusing LR to gravity without difficulty, site without redness or swelling.  Plan of care discussed with pt. Pt voiced understanding and consent.

## 2023-11-18 NOTE — NURSING
1611 Patient arrived to drop of 24 hour urine collection. Per MD order, placed patient on BP to cycle every 10 minutes for monitoring while waiting for urine results. Patient states she has not started Labetalol due to pharmacy not having medication for her to fill. Patient states no consistent headache, visual disturbances, epigastric pain or further complaints at this time.    1800 Provided MD with results of 24hr urine collection. MD states patient can be discharged from labor and delivery at this time. Per MD, Labetalol 200mg BID and Flagyl 500 BID x7 days to be called in for patient. Patient instructed to begin BP medication as soon as possible. Patient states understanding of importance of BP medication.

## 2023-11-20 ENCOUNTER — HOSPITAL ENCOUNTER (OUTPATIENT)
Facility: HOSPITAL | Age: 38
Discharge: HOME | End: 2023-11-21
Attending: SPECIALIST | Admitting: SPECIALIST
Payer: MEDICAID

## 2023-11-20 DIAGNOSIS — O30.002 TWIN GESTATION IN SECOND TRIMESTER: ICD-10-CM

## 2023-11-20 DIAGNOSIS — O13.3 GESTATIONAL HYPERTENSION, THIRD TRIMESTER: Primary | ICD-10-CM

## 2023-11-20 LAB
ALBUMIN SERPL BCP-MCNC: 3.1 G/DL (ref 3.5–5.2)
ALP SERPL-CCNC: 95 U/L (ref 55–135)
ALT SERPL W/O P-5'-P-CCNC: 8 U/L (ref 10–44)
ANION GAP SERPL CALC-SCNC: 5 MMOL/L (ref 8–16)
AST SERPL-CCNC: 17 U/L (ref 10–40)
BASOPHILS # BLD AUTO: 0.03 K/UL (ref 0–0.2)
BASOPHILS NFR BLD: 0.4 % (ref 0–1.9)
BILIRUB SERPL-MCNC: 0.2 MG/DL (ref 0.1–1)
BUN SERPL-MCNC: 5 MG/DL (ref 6–20)
CALCIUM SERPL-MCNC: 8.4 MG/DL (ref 8.7–10.5)
CHLORIDE SERPL-SCNC: 106 MMOL/L (ref 95–110)
CO2 SERPL-SCNC: 24 MMOL/L (ref 23–29)
CREAT SERPL-MCNC: 0.6 MG/DL (ref 0.5–1.4)
CTP QC/QA: YES
DIFFERENTIAL METHOD: ABNORMAL
EOSINOPHIL # BLD AUTO: 0.1 K/UL (ref 0–0.5)
EOSINOPHIL NFR BLD: 0.8 % (ref 0–8)
ERYTHROCYTE [DISTWIDTH] IN BLOOD BY AUTOMATED COUNT: 20.1 % (ref 11.5–14.5)
EST. GFR  (NO RACE VARIABLE): >60 ML/MIN/1.73 M^2
FIBRONECTIN FETAL SPEC QL: NEGATIVE
GLUCOSE SERPL-MCNC: 107 MG/DL (ref 70–110)
HCT VFR BLD AUTO: 32.1 % (ref 37–48.5)
HGB BLD-MCNC: 9.8 G/DL (ref 12–16)
IMM GRANULOCYTES # BLD AUTO: 0.08 K/UL (ref 0–0.04)
IMM GRANULOCYTES NFR BLD AUTO: 1.1 % (ref 0–0.5)
LYMPHOCYTES # BLD AUTO: 1.5 K/UL (ref 1–4.8)
LYMPHOCYTES NFR BLD: 21.2 % (ref 18–48)
MCH RBC QN AUTO: 27.9 PG (ref 27–31)
MCHC RBC AUTO-ENTMCNC: 30.5 G/DL (ref 32–36)
MCV RBC AUTO: 92 FL (ref 82–98)
MONOCYTES # BLD AUTO: 0.8 K/UL (ref 0.3–1)
MONOCYTES NFR BLD: 10.5 % (ref 4–15)
NEUTROPHILS # BLD AUTO: 4.8 K/UL (ref 1.8–7.7)
NEUTROPHILS NFR BLD: 66 % (ref 38–73)
NRBC BLD-RTO: 0 /100 WBC
PLATELET # BLD AUTO: 147 K/UL (ref 150–450)
PMV BLD AUTO: 9.2 FL (ref 9.2–12.9)
POC PH, VAGINAL: NEGATIVE (ref 4.5–5.5)
POTASSIUM SERPL-SCNC: 4.1 MMOL/L (ref 3.5–5.1)
PROT SERPL-MCNC: 5.6 G/DL (ref 6–8.4)
RBC # BLD AUTO: 3.51 M/UL (ref 4–5.4)
RUPTURE OF MEMBRANE: NEGATIVE
SODIUM SERPL-SCNC: 135 MMOL/L (ref 136–145)
WBC # BLD AUTO: 7.22 K/UL (ref 3.9–12.7)

## 2023-11-20 PROCEDURE — 82731 ASSAY OF FETAL FIBRONECTIN: CPT | Performed by: SPECIALIST

## 2023-11-20 PROCEDURE — 63600175 PHARM REV CODE 636 W HCPCS: Performed by: SPECIALIST

## 2023-11-20 PROCEDURE — 25000003 PHARM REV CODE 250: Performed by: SPECIALIST

## 2023-11-20 PROCEDURE — 80053 COMPREHEN METABOLIC PANEL: CPT | Performed by: SPECIALIST

## 2023-11-20 PROCEDURE — 96372 THER/PROPH/DIAG INJ SC/IM: CPT | Performed by: SPECIALIST

## 2023-11-20 PROCEDURE — 99999 HC NO LEVEL OF SERVICE - ED ONLY: CPT

## 2023-11-20 PROCEDURE — G0378 HOSPITAL OBSERVATION PER HR: HCPCS

## 2023-11-20 PROCEDURE — 59025 FETAL NON-STRESS TEST: CPT

## 2023-11-20 PROCEDURE — 36415 COLL VENOUS BLD VENIPUNCTURE: CPT | Performed by: SPECIALIST

## 2023-11-20 PROCEDURE — 85025 COMPLETE CBC W/AUTO DIFF WBC: CPT | Performed by: SPECIALIST

## 2023-11-20 RX ORDER — BETAMETHASONE SODIUM PHOSPHATE AND BETAMETHASONE ACETATE 3; 3 MG/ML; MG/ML
12 INJECTION, SUSPENSION INTRA-ARTICULAR; INTRALESIONAL; INTRAMUSCULAR; SOFT TISSUE ONCE
Status: DISCONTINUED | OUTPATIENT
Start: 2023-11-21 | End: 2023-11-21 | Stop reason: HOSPADM

## 2023-11-20 RX ORDER — SODIUM CHLORIDE, SODIUM LACTATE, POTASSIUM CHLORIDE, CALCIUM CHLORIDE 600; 310; 30; 20 MG/100ML; MG/100ML; MG/100ML; MG/100ML
INJECTION, SOLUTION INTRAVENOUS CONTINUOUS
Status: DISCONTINUED | OUTPATIENT
Start: 2023-11-20 | End: 2023-11-21 | Stop reason: HOSPADM

## 2023-11-20 RX ORDER — NIFEDIPINE 30 MG/1
30 TABLET, EXTENDED RELEASE ORAL DAILY
Status: DISCONTINUED | OUTPATIENT
Start: 2023-11-20 | End: 2023-11-21 | Stop reason: HOSPADM

## 2023-11-20 RX ORDER — BETAMETHASONE SODIUM PHOSPHATE AND BETAMETHASONE ACETATE 3; 3 MG/ML; MG/ML
12 INJECTION, SUSPENSION INTRA-ARTICULAR; INTRALESIONAL; INTRAMUSCULAR; SOFT TISSUE ONCE
Status: COMPLETED | OUTPATIENT
Start: 2023-11-20 | End: 2023-11-20

## 2023-11-20 RX ORDER — LABETALOL 200 MG/1
200 TABLET, FILM COATED ORAL 3 TIMES DAILY
Status: DISCONTINUED | OUTPATIENT
Start: 2023-11-20 | End: 2023-11-21 | Stop reason: HOSPADM

## 2023-11-20 RX ORDER — TERBUTALINE SULFATE 1 MG/ML
0.25 INJECTION SUBCUTANEOUS ONCE AS NEEDED
Status: COMPLETED | OUTPATIENT
Start: 2023-11-20 | End: 2023-11-20

## 2023-11-20 RX ORDER — ACETAMINOPHEN 500 MG
1000 TABLET ORAL ONCE
Status: COMPLETED | OUTPATIENT
Start: 2023-11-20 | End: 2023-11-20

## 2023-11-20 RX ADMIN — ACETAMINOPHEN 1000 MG: 500 TABLET ORAL at 10:11

## 2023-11-20 RX ADMIN — SODIUM CHLORIDE, SODIUM LACTATE, POTASSIUM CHLORIDE, AND CALCIUM CHLORIDE: .6; .31; .03; .02 INJECTION, SOLUTION INTRAVENOUS at 07:11

## 2023-11-20 RX ADMIN — TERBUTALINE SULFATE 0.25 MG: 1 INJECTION, SOLUTION SUBCUTANEOUS at 07:11

## 2023-11-20 RX ADMIN — SODIUM CHLORIDE, SODIUM LACTATE, POTASSIUM CHLORIDE, AND CALCIUM CHLORIDE: .6; .31; .03; .02 INJECTION, SOLUTION INTRAVENOUS at 11:11

## 2023-11-20 RX ADMIN — BETAMETHASONE ACETATE AND BETAMETHASONE SODIUM PHOSPHATE 12 MG: 3; 3 INJECTION, SUSPENSION INTRA-ARTICULAR; INTRALESIONAL; INTRAMUSCULAR; SOFT TISSUE at 01:11

## 2023-11-20 RX ADMIN — NIFEDIPINE 30 MG: 30 TABLET, FILM COATED, EXTENDED RELEASE ORAL at 12:11

## 2023-11-20 RX ADMIN — LABETALOL HYDROCHLORIDE 200 MG: 200 TABLET, FILM COATED ORAL at 08:11

## 2023-11-20 RX ADMIN — LABETALOL HYDROCHLORIDE 200 MG: 200 TABLET, FILM COATED ORAL at 03:11

## 2023-11-20 NOTE — PROGRESS NOTES
[facility]  Obstetrics  Antepartum Progress Note    Patient Name: Lorena Green  MRN: [id number]  Admission Date: 2023  Hospital Length of Stay: 0 days  Attending Physician: Ailyn Ji MD  Primary Care Provider: No, Primary Doctor    Subjective:     Principal Problem:Gestational hypertension, third trimester    HPI:  No notes on file    Hospital Course:  No notes on file    Obstetric HPI:  38-year-old  6 para 2004 at 30 weeks with twin gestation.  Patient has been followed over the last couple weeks with elevated blood pressures.  She was started over the weekend on labetalol 200 mg 3 times a day and reports has been at home at bedrest and taking it.  Patient presents today for her nonstress test but had a headache.    Patient has pertinent past prenatal history with a  delivery at 32 weeks and a previous intrauterine fetal demise at 32 weeks       Objective:     Vital Signs (Most Recent):  Pulse: 79 (23 1254)  Resp: 16 (23 1254)  BP: (!) 140/93 (23 1254) Vital Signs (24h Range):  Pulse:  [79-93] 79  Resp:  [16-18] 16  BP: (138-151)/() 140/93        There is no height or weight on file to calculate BMI.    FHTs:  A and B twin gestation fetal heart tones are reactive and reassuring no significant decelerations  TOCO:  Episodic contractions  No intake or output data in the 24 hours ending 23 1441    Cervical Exam:  Deferred today  No significant contractions     Significant Labs:  Recent Lab Results         23  1216        Albumin 3.1       ALP 95       ALT 8       Anion Gap 5       AST 17       Baso # 0.03       Basophil % 0.4       BILIRUBIN TOTAL 0.2  Comment: For infants and newborns, interpretation of results should be based  on gestational age, weight and in agreement with clinical  observations.    Premature Infant recommended reference ranges:  Up to 24 hours.............<8.0 mg/dL  Up to 48 hours............<12.0 mg/dL  3-5  days..................<15.0 mg/dL  6-29 days.................<15.0 mg/dL         BUN 5       Calcium 8.4       Chloride 106       CO2 24       Creatinine 0.6       Differential Method Automated       eGFR >60.0       Eos # 0.1       Eosinophil % 0.8       Glucose 107       Gran # (ANC) 4.8       Gran % 66.0       Hematocrit 32.1       Hemoglobin 9.8       Immature Grans (Abs) 0.08  Comment: Mild elevation in immature granulocytes is non specific and   can be seen in a variety of conditions including stress response,   acute inflammation, trauma and pregnancy. Correlation with other   laboratory and clinical findings is essential.         Immature Granulocytes 1.1       Lymph # 1.5       Lymph % 21.2       MCH 27.9       MCHC 30.5       MCV 92       Mono # 0.8       Mono % 10.5       MPV 9.2       nRBC 0       Platelet Count 147       Potassium 4.1       PROTEIN TOTAL 5.6       RBC 3.51       RDW 20.1       Sodium 135       WBC 7.22               Physical Exam  Patient just ate and says headache is much better   Most recent blood pressure 139/89  Extremities no edema DTRs not brisk 1+    Review of Systems  Assessment/Plan:     38 y.o. female  at 30w0d for:    * Gestational hypertension, third trimester  Intrauterine pregnancy twin gestation 30 week gestation  Hypertension has developed over the last several weeks.  Patient is now on labetalol 200 mg 3 times a day we will continue that dose.  I added Procardia XL 30 mg.  We will continue that daily.  24 hour urine over the weekend was protein of 166.  Have discussed with the patient she will most likely remain on bedrest and will have to watch her closely over the pregnancy.  She is her ultrasound scheduled in my office on Wednesday we will likely keep that appointment.  Patient received 1st dose of Celestone for fetal lung maturity and will give that next dose tomorrow.            Ailyn Ji MD  Obstetrics  [facility]

## 2023-11-20 NOTE — ASSESSMENT & PLAN NOTE
Intrauterine pregnancy twin gestation 30 week gestation  Hypertension has developed over the last several weeks.  Patient is now on labetalol 200 mg 3 times a day we will continue that dose.  I added Procardia XL 30 mg.  We will continue that daily.  24 hour urine over the weekend was protein of 166.  Have discussed with the patient she will most likely remain on bedrest and will have to watch her closely over the pregnancy.  She is her ultrasound scheduled in my office on Wednesday we will likely keep that appointment.  Patient received 1st dose of Celestone for fetal lung maturity and will give that next dose tomorrow.

## 2023-11-20 NOTE — SUBJECTIVE & OBJECTIVE
Obstetric HPI:  38-year-old  6 para 2004 at 30 weeks with twin gestation.  Patient has been followed over the last couple weeks with elevated blood pressures.  She was started over the weekend on labetalol 200 mg 3 times a day and reports has been at home at bedrest and taking it.  Patient presents today for her nonstress test but had a headache.    Patient has pertinent past prenatal history with a  delivery at 32 weeks and a previous intrauterine fetal demise at 32 weeks       Objective:     Vital Signs (Most Recent):  Pulse: 79 (23 1254)  Resp: 16 (23 1254)  BP: (!) 140/93 (23 1254) Vital Signs (24h Range):  Pulse:  [79-93] 79  Resp:  [16-18] 16  BP: (138-151)/() 140/93        There is no height or weight on file to calculate BMI.    FHTs:  A and B twin gestation fetal heart tones are reactive and reassuring no significant decelerations  TOCO:  Episodic contractions  No intake or output data in the 24 hours ending 23 1441    Cervical Exam:  Deferred today  No significant contractions     Significant Labs:  Recent Lab Results         23  1216        Albumin 3.1       ALP 95       ALT 8       Anion Gap 5       AST 17       Baso # 0.03       Basophil % 0.4       BILIRUBIN TOTAL 0.2  Comment: For infants and newborns, interpretation of results should be based  on gestational age, weight and in agreement with clinical  observations.    Premature Infant recommended reference ranges:  Up to 24 hours.............<8.0 mg/dL  Up to 48 hours............<12.0 mg/dL  3-5 days..................<15.0 mg/dL  6-29 days.................<15.0 mg/dL         BUN 5       Calcium 8.4       Chloride 106       CO2 24       Creatinine 0.6       Differential Method Automated       eGFR >60.0       Eos # 0.1       Eosinophil % 0.8       Glucose 107       Gran # (ANC) 4.8       Gran % 66.0       Hematocrit 32.1       Hemoglobin 9.8       Immature Grans (Abs) 0.08  Comment: Mild  elevation in immature granulocytes is non specific and   can be seen in a variety of conditions including stress response,   acute inflammation, trauma and pregnancy. Correlation with other   laboratory and clinical findings is essential.         Immature Granulocytes 1.1       Lymph # 1.5       Lymph % 21.2       MCH 27.9       MCHC 30.5       MCV 92       Mono # 0.8       Mono % 10.5       MPV 9.2       nRBC 0       Platelet Count 147       Potassium 4.1       PROTEIN TOTAL 5.6       RBC 3.51       RDW 20.1       Sodium 135       WBC 7.22               Physical Exam  Patient just ate and says headache is much better   Most recent blood pressure 139/89  Extremities no edema DTRs not brisk 1+    Review of Systems

## 2023-11-20 NOTE — PLAN OF CARE
[facility]  Initial Discharge Assessment       Primary Care Provider: No, Primary Doctor    Admission Diagnosis: Twin gestation in second trimester [O30.002]    Admission Date: 11/20/2023  Expected Discharge Date:     Pt is a 38-year-old female/male who arrived from home with Gestational hypertension, third trimester. Information verified as correct on facesheet. The assessment was completed at the patient's bedside.  Pt lives alone. Pt does not have a Living Will or Advance Directive. The Pt is oriented to person, places, and times. PCP last seen two weeks ago.  Pt denies Coumadin, dialysis, DME, HH, and has not been readmitted into the hospital in the last thirty days.  Pt is capable of performing ADLs without assistance. Pt drivers to and from medical appointments. Pt reports she takes medication as prescribed; pharmacy used [facility].  Pt verbalized plan to discharge home via private vehicle. Pt has no other needs to be addressed at this time. CM reviewed the chart and will continue to monitor.       Transition of Care Barriers: None    Payor: MEDICAID / Plan: [place] [employer] / Product Type: Managed Medicaid /     Extended Emergency Contact Information  Primary Emergency Contact: Milagro Feliz   [facility]  Home Phone: [phone]  Relation: Friend    Discharge Plan A: Home  Discharge Plan B: Home with family      [facility] - [place] - [address]  [address] [address]  Phone: [phone] Fax: [phone]      Initial Assessment (most recent)       Adult Discharge Assessment - 11/20/23 1525          Discharge Assessment    Assessment Type Discharge Planning Assessment     Confirmed/corrected address, phone number and insurance Yes     Confirmed Demographics Correct on Facesheet     Source of Information patient     When was your last doctors appointment? --   Two weeks ago    Does patient/caregiver understand observation status Yes      Communicated XU with patient/caregiver Date not available/Unable to determine     Reason For Admission Gestational hypertension, third trimester     People in Home alone     Facility Arrived From: home     Do you expect to return to your current living situation? Yes     Do you have help at home or someone to help you manage your care at home? No     Prior to hospitilization cognitive status: Alert/Oriented     Current cognitive status: Alert/Oriented     Equipment Currently Used at Home none     Readmission within 30 days? Yes     Patient currently being followed by outpatient case management? No     Do you currently have service(s) that help you manage your care at home? No     Do you take prescription medications? Yes     Do you have prescription coverage? Yes     Coverage Payor: MEDICAID - [facility] CONNECT     Do you have any problems affording any of your prescribed medications? No     Is the patient taking medications as prescribed? yes     Who is going to help you get home at discharge? Private vehicle     How do you get to doctors appointments? car, drives self     Are you on dialysis? No     Do you take coumadin? No     DME Needed Upon Discharge  none     Discharge Plan discussed with: Patient     Transition of Care Barriers None     Discharge Plan A Home     Discharge Plan B Home with family

## 2023-11-20 NOTE — NURSING
1200- spoke with dr Ji who reviewed pts strip and Bps, new order for CBC/CMP and procardia 30mgXL now X 1, pt took own tylenol for HA 7/10 without visual disturbances or RUQ pain. MD aware.    1256- MD reviewed labs & latest BPS, pt without relief of HA with tylenol, still c/o 7/10 HA without visual disturbances or RUQ pain, new orders to admit for 23hr obs, hourly VS now with every2 hours overnight, celestone 12mg IM now and repeat in 24 hours, heplock IV and she can eat. Report and orders given to VIDHI Womack. Pt ambulatory to rm [id number] at 1315 without difficulty, NAD noted.

## 2023-11-21 VITALS
DIASTOLIC BLOOD PRESSURE: 87 MMHG | SYSTOLIC BLOOD PRESSURE: 136 MMHG | RESPIRATION RATE: 17 BRPM | HEART RATE: 96 BPM | OXYGEN SATURATION: 100 % | TEMPERATURE: 98 F

## 2023-11-21 PROCEDURE — 96372 THER/PROPH/DIAG INJ SC/IM: CPT | Performed by: SPECIALIST

## 2023-11-21 PROCEDURE — 25000003 PHARM REV CODE 250: Performed by: SPECIALIST

## 2023-11-21 PROCEDURE — G0378 HOSPITAL OBSERVATION PER HR: HCPCS

## 2023-11-21 PROCEDURE — 63600175 PHARM REV CODE 636 W HCPCS: Performed by: SPECIALIST

## 2023-11-21 RX ORDER — FAMOTIDINE 10 MG/ML
20 INJECTION INTRAVENOUS 2 TIMES DAILY
Status: DISCONTINUED | OUTPATIENT
Start: 2023-11-21 | End: 2023-11-21 | Stop reason: HOSPADM

## 2023-11-21 RX ORDER — BUTALBITAL, ACETAMINOPHEN AND CAFFEINE 50; 325; 40 MG/1; MG/1; MG/1
1 TABLET ORAL EVERY 6 HOURS PRN
Status: DISCONTINUED | OUTPATIENT
Start: 2023-11-21 | End: 2023-11-21 | Stop reason: HOSPADM

## 2023-11-21 RX ORDER — BETAMETHASONE SODIUM PHOSPHATE AND BETAMETHASONE ACETATE 3; 3 MG/ML; MG/ML
12 INJECTION, SUSPENSION INTRA-ARTICULAR; INTRALESIONAL; INTRAMUSCULAR; SOFT TISSUE ONCE
Status: COMPLETED | OUTPATIENT
Start: 2023-11-21 | End: 2023-11-21

## 2023-11-21 RX ORDER — NIFEDIPINE 30 MG/1
30 TABLET, EXTENDED RELEASE ORAL DAILY
Qty: 30 TABLET | Refills: 11 | Status: ON HOLD | OUTPATIENT
Start: 2023-11-21 | End: 2023-12-19

## 2023-11-21 RX ADMIN — BETAMETHASONE ACETATE AND BETAMETHASONE SODIUM PHOSPHATE 12 MG: 3; 3 INJECTION, SUSPENSION INTRA-ARTICULAR; INTRALESIONAL; INTRAMUSCULAR; SOFT TISSUE at 11:11

## 2023-11-21 RX ADMIN — LABETALOL HYDROCHLORIDE 200 MG: 200 TABLET, FILM COATED ORAL at 09:11

## 2023-11-21 RX ADMIN — BUTALBITAL, ACETAMINOPHEN AND CAFFEINE 1 TABLET: 325; 50; 40 TABLET ORAL at 02:11

## 2023-11-21 RX ADMIN — NIFEDIPINE 30 MG: 30 TABLET, FILM COATED, EXTENDED RELEASE ORAL at 09:11

## 2023-11-21 RX ADMIN — FAMOTIDINE 20 MG: 10 INJECTION, SOLUTION INTRAVENOUS at 09:11

## 2023-11-21 NOTE — PLAN OF CARE
11/21/23 1116   Final Note   Assessment Type Final Discharge Note   Anticipated Discharge Disposition Home   What phone number can be called within the next 1-3 days to see how you are doing after discharge? [phone]   Post-Acute Status   Discharge Delays None known at this time     Discharge orders and chart reviewed with no further post-acute discharge needs identified at this time.  At this time, patient is cleared for discharge from Case Management standpoint.

## 2023-11-21 NOTE — ASSESSMENT & PLAN NOTE
Intrauterine pregnancy twin gestation 30 week gestation  Hypertension has developed over the last several weeks.  Patient is now on labetalol 200 mg 3 times a day we will continue that dose.  I added Procardia XL 30 mg.  We will continue that daily.  24 hour urine over the weekend was protein of 166.  Have discussed with the patient she will most likely remain on bedrest and will have to watch her closely over the pregnancy.  She is her ultrasound scheduled in my office on Wednesday we will likely keep that appointment.  Patient received 1st dose of Celestone for fetal lung maturity and will give that next dose tomorrow.        Episodic contractions.  I did check the patient's cervix and I found her to be 1-1/2 cm 50% effaced -3 station.  We will get an ultrasound to confirm position of the fetus a and fetus B. currently it does not appear that she is fredy.  If she does contract we will start with terbutaline and see if we can settle these out.  The cervix however does appear to be a change from her last cervical check over the weekend and last week.  She is already receiving Celestone.  If she does appear to be progressing into labor we will start antibiotics and magnesium for fetal benefits.

## 2023-11-21 NOTE — PROGRESS NOTES
[facility]  Obstetrics  Antepartum Progress Note    Patient Name: Lorena Green  MRN: [id number]  Admission Date: 11/20/2023  Hospital Length of Stay: 0 days  Attending Physician: Ailyn Ji MD  Primary Care Provider: No, Primary Doctor    Subjective:     Principal Problem:Gestational hypertension, third trimester    HPI:  No notes on file    Hospital Course:  No notes on file    Obstetric HPI:  Patient has noted contractions.  She was having contractions also over the weekend.  No vaginal bleeding no leaking of fluid.  Patient does not appear to be having contractions now.  Objective:     Vital Signs (Most Recent):  Temp: 98.1 °F (36.7 °C) (11/20/23 1840)  Pulse: 86 (11/20/23 1757)  Resp: 18 (11/20/23 1757)  BP: (!) 138/91 (11/20/23 1757)  SpO2: 100 % (11/20/23 1513) Vital Signs (24h Range):  Temp:  [98 °F (36.7 °C)-98.1 °F (36.7 °C)] 98.1 °F (36.7 °C)  Pulse:  [] 86  Resp:  [16-18] 18  SpO2:  [99 %-100 %] 100 %  BP: (111-160)/() 138/91        There is no height or weight on file to calculate BMI.    FHT:  Reactive for twin a and B  TOCO:  Currently no contractions on the tocometer.    No intake or output data in the 24 hours ending 11/20/23 1928    Cervical Exam:  Dilation:  1.5  Effacement:  50%  Station: -3  Presentation: Vertex     Significant Labs:  Recent Lab Results         11/20/23  1550   11/20/23  1540   11/20/23  1216        POC pH, Vaginal   negative         Rupture of Membrane   Negative         Albumin     3.1       ALP     95       ALT     8       Anion Gap     5       AST     17       Baso #     0.03       Basophil %     0.4       BILIRUBIN TOTAL     0.2  Comment: For infants and newborns, interpretation of results should be based  on gestational age, weight and in agreement with clinical  observations.    Premature Infant recommended reference ranges:  Up to 24 hours.............<8.0 mg/dL  Up to 48 hours............<12.0 mg/dL  3-5 days..................<15.0  mg/dL  6-29 days.................<15.0 mg/dL         BUN     5       Calcium     8.4       Chloride     106       CO2     24       Creatinine     0.6       Differential Method     Automated       eGFR     >60.0       Eos #     0.1       Eosinophil %     0.8       Fetal Fibronectin Negative           Glucose     107       Gran # (ANC)     4.8       Gran %     66.0       Hematocrit     32.1       Hemoglobin     9.8       Immature Grans (Abs)     0.08  Comment: Mild elevation in immature granulocytes is non specific and   can be seen in a variety of conditions including stress response,   acute inflammation, trauma and pregnancy. Correlation with other   laboratory and clinical findings is essential.         Immature Granulocytes     1.1       Lymph #     1.5       Lymph %     21.2       MCH     27.9       MCHC     30.5       MCV     92       Mono #     0.8       Mono %     10.5       MPV     9.2       nRBC     0       Platelet Count     147       Potassium     4.1       PROTEIN TOTAL     5.6        Acceptable   Yes         RBC     3.51       RDW     20.1       Sodium     135       WBC     7.22               Physical Exam    Review of Systems  Assessment/Plan:     38 y.o. female  at 30w0d for:    * Gestational hypertension, third trimester  Intrauterine pregnancy twin gestation 30 week gestation  Hypertension has developed over the last several weeks.  Patient is now on labetalol 200 mg 3 times a day we will continue that dose.  I added Procardia XL 30 mg.  We will continue that daily.  24 hour urine over the weekend was protein of 166.  Have discussed with the patient she will most likely remain on bedrest and will have to watch her closely over the pregnancy.  She is her ultrasound scheduled in my office on Wednesday we will likely keep that appointment.  Patient received 1st dose of Celestone for fetal lung maturity and will give that next dose tomorrow.        Episodic contractions.  I did  check the patient's cervix and I found her to be 1-1/2 cm 50% effaced -3 station.  We will get an ultrasound to confirm position of the fetus a and fetus B. currently it does not appear that she is fredy.  If she does contract we will start with terbutaline and see if we can settle these out.  The cervix however does appear to be a change from her last cervical check over the weekend and last week.  She is already receiving Celestone.  If she does appear to be progressing into labor we will start antibiotics and magnesium for fetal benefits.            Ailyn Ji MD  Obstetrics  [facility]

## 2023-11-21 NOTE — SUBJECTIVE & OBJECTIVE
Obstetric HPI:  Patient has noted contractions.  She was having contractions also over the weekend.  No vaginal bleeding no leaking of fluid.  Patient does not appear to be having contractions now.  Objective:     Vital Signs (Most Recent):  Temp: 98.1 °F (36.7 °C) (11/20/23 1840)  Pulse: 86 (11/20/23 1757)  Resp: 18 (11/20/23 1757)  BP: (!) 138/91 (11/20/23 1757)  SpO2: 100 % (11/20/23 1513) Vital Signs (24h Range):  Temp:  [98 °F (36.7 °C)-98.1 °F (36.7 °C)] 98.1 °F (36.7 °C)  Pulse:  [] 86  Resp:  [16-18] 18  SpO2:  [99 %-100 %] 100 %  BP: (111-160)/() 138/91        There is no height or weight on file to calculate BMI.    FHT:  Reactive for twin a and B  TOCO:  Currently no contractions on the tocometer.    No intake or output data in the 24 hours ending 11/20/23 1928    Cervical Exam:  Dilation:  1.5  Effacement:  50%  Station: -3  Presentation: Vertex     Significant Labs:  Recent Lab Results         11/20/23  1550   11/20/23  1540   11/20/23  1216        POC pH, Vaginal   negative         Rupture of Membrane   Negative         Albumin     3.1       ALP     95       ALT     8       Anion Gap     5       AST     17       Baso #     0.03       Basophil %     0.4       BILIRUBIN TOTAL     0.2  Comment: For infants and newborns, interpretation of results should be based  on gestational age, weight and in agreement with clinical  observations.    Premature Infant recommended reference ranges:  Up to 24 hours.............<8.0 mg/dL  Up to 48 hours............<12.0 mg/dL  3-5 days..................<15.0 mg/dL  6-29 days.................<15.0 mg/dL         BUN     5       Calcium     8.4       Chloride     106       CO2     24       Creatinine     0.6       Differential Method     Automated       eGFR     >60.0       Eos #     0.1       Eosinophil %     0.8       Fetal Fibronectin Negative           Glucose     107       Gran # (ANC)     4.8       Gran %     66.0       Hematocrit     32.1       Hemoglobin      9.8       Immature Grans (Abs)     0.08  Comment: Mild elevation in immature granulocytes is non specific and   can be seen in a variety of conditions including stress response,   acute inflammation, trauma and pregnancy. Correlation with other   laboratory and clinical findings is essential.         Immature Granulocytes     1.1       Lymph #     1.5       Lymph %     21.2       MCH     27.9       MCHC     30.5       MCV     92       Mono #     0.8       Mono %     10.5       MPV     9.2       nRBC     0       Platelet Count     147       Potassium     4.1       PROTEIN TOTAL     5.6        Acceptable   Yes         RBC     3.51       RDW     20.1       Sodium     135       WBC     7.22               Physical Exam    Review of Systems

## 2023-11-21 NOTE — DISCHARGE SUMMARY
[facility]  Obstetrics  Discharge Summary      Patient Name: Lorena Green  MRN: [id number]  Admission Date: 11/20/2023  Hospital Length of Stay: 0 days  Discharge Date and Time:  11/21/2023 8:41 AM  Attending Physician: Ailyn Ji MD   Discharging Provider: Ailyn Ji MD   Primary Care Provider: Ani, Primary Doctor    HPI: No notes on file    FHT:  150s with good long-term variability and accelerations no decelerations twin a and B Cat 1 (reassuring)  TOCO:  Rare    * No surgery found *     Hospital Course:   No notes on file         Final Active Diagnoses:    Diagnosis Date Noted POA    PRINCIPAL PROBLEM:  Gestational hypertension, third trimester [O13.3] 11/20/2023 Yes      Problems Resolved During this Admission:          Lab Results   Component Value Date    GROUPTRH B POS 11/17/2023         Physical exam:   Patient feeling a bit better this morning.  Headache comes and goes.  Patient does feel like this has been more related to her migraine headache she used to have in the past   Blood pressures look great today and overnight.  Abdomen is gravid nontender   Sterile vaginal exam cervix is 1-1-1/2 cm about 50-65% effaced-2-3 3 station  Extremities no Homans or edema    Assessment and plan:   Thirty week 1 day intrauterine pregnancy with twins   Twins are vertex vertex according to ultrasound yesterday  Patient is to follow up with me in the office with ultrasound for growth tomorrow   Bedrest and pelvic rest have been discussed   Patient will continue labetalol 200 mg 3 times a day and Procardia XL 30 mg a day.  Discussed uterine activity and patient will return if contractions become regular   Patient has 2nd dose of Celestone today around 11-12    Immunizations       None            This patient has no babies on file.  Pending Diagnostic Studies:       None            Discharged Condition: good    Disposition: Home or Self Care    Follow Up:    Patient Instructions:      Diet Adult  Regular     Activity as tolerated   Order Comments: Pelvic rest x 6 weeks     Medications:  Current Discharge Medication List        START taking these medications    Details   NIFEdipine (PROCARDIA-XL) 30 MG (OSM) 24 hr tablet Take 1 tablet (30 mg total) by mouth once daily.  Qty: 30 tablet, Refills: 11    Comments: .           CONTINUE these medications which have NOT CHANGED    Details   aspirin 81 MG Chew Take 81 mg by mouth once daily.      docusate sodium (COLACE) 100 MG capsule Take 100 mg by mouth 2 (two) times daily.      ferrous gluconate (FERGON) 324 MG tablet Take 225 mg by mouth daily with breakfast.      labetaloL (NORMODYNE) 200 MG tablet Take 1 tablet (200 mg total) by mouth 2 (two) times daily.  Qty: 60 tablet, Refills: 11    Comments: .      metroNIDAZOLE (FLAGYL) 500 MG tablet Take 1 tablet (500 mg total) by mouth every 12 (twelve) hours.  Qty: 13 tablet, Refills: 0      multivitamin capsule Take 1 capsule by mouth once daily.      pantoprazole (PROTONIX) 40 MG tablet Take 40 mg by mouth once daily.      traMADoL (ULTRAM) 50 mg tablet Take 50 mg by mouth.             Ailyn Ji MD  Obstetrics  [facility]

## 2023-11-24 ENCOUNTER — HOSPITAL ENCOUNTER (OUTPATIENT)
Facility: HOSPITAL | Age: 38
Discharge: HOME | End: 2023-11-24
Attending: SPECIALIST | Admitting: SPECIALIST
Payer: MEDICAID

## 2023-11-24 VITALS — HEART RATE: 71 BPM | SYSTOLIC BLOOD PRESSURE: 137 MMHG | DIASTOLIC BLOOD PRESSURE: 94 MMHG | RESPIRATION RATE: 20 BRPM

## 2023-11-24 DIAGNOSIS — O30.002 TWIN GESTATION IN SECOND TRIMESTER: ICD-10-CM

## 2023-11-24 DIAGNOSIS — O30.009 TWIN PREGNANCY: ICD-10-CM

## 2023-11-24 PROCEDURE — 59025 FETAL NON-STRESS TEST: CPT

## 2023-11-27 ENCOUNTER — HOSPITAL ENCOUNTER (OUTPATIENT)
Facility: HOSPITAL | Age: 38
Discharge: HOME | End: 2023-11-27
Attending: SPECIALIST | Admitting: SPECIALIST
Payer: MEDICAID

## 2023-11-27 VITALS — SYSTOLIC BLOOD PRESSURE: 139 MMHG | HEART RATE: 81 BPM | DIASTOLIC BLOOD PRESSURE: 94 MMHG

## 2023-11-27 DIAGNOSIS — O30.002 TWIN GESTATION IN SECOND TRIMESTER: ICD-10-CM

## 2023-11-27 PROCEDURE — 59025 FETAL NON-STRESS TEST: CPT

## 2023-11-30 ENCOUNTER — HOSPITAL ENCOUNTER (OUTPATIENT)
Facility: HOSPITAL | Age: 38
Discharge: HOME | End: 2023-11-30
Attending: SPECIALIST | Admitting: SPECIALIST
Payer: MEDICAID

## 2023-11-30 VITALS — HEART RATE: 93 BPM | DIASTOLIC BLOOD PRESSURE: 89 MMHG | SYSTOLIC BLOOD PRESSURE: 133 MMHG

## 2023-11-30 DIAGNOSIS — O30.002 TWIN GESTATION IN SECOND TRIMESTER: ICD-10-CM

## 2023-11-30 PROCEDURE — 59025 FETAL NON-STRESS TEST: CPT

## 2023-12-04 ENCOUNTER — HOSPITAL ENCOUNTER (OUTPATIENT)
Facility: HOSPITAL | Age: 38
Discharge: HOME | End: 2023-12-04
Attending: SPECIALIST | Admitting: SPECIALIST
Payer: MEDICAID

## 2023-12-04 VITALS — SYSTOLIC BLOOD PRESSURE: 141 MMHG | RESPIRATION RATE: 17 BRPM | HEART RATE: 91 BPM | DIASTOLIC BLOOD PRESSURE: 94 MMHG

## 2023-12-04 DIAGNOSIS — O10.919 CHRONIC HYPERTENSION AFFECTING PREGNANCY: Primary | ICD-10-CM

## 2023-12-04 DIAGNOSIS — Z34.90 ENCOUNTER FOR INDUCTION OF LABOR: Primary | ICD-10-CM

## 2023-12-04 DIAGNOSIS — O30.002 TWIN GESTATION IN SECOND TRIMESTER: ICD-10-CM

## 2023-12-04 PROCEDURE — 59025 FETAL NON-STRESS TEST: CPT

## 2023-12-04 NOTE — PROGRESS NOTES
12/04/23 1708   Vital Signs   BP (!) 141/94   MAP (mmHg) 113   Pulse 91   Resp 17   Non Stress Test - General   Indications/Pt Reported Complaint Zakiya Twins, HTN, AMA   Test Duration (min) 45 min   Number of Fetuses 2   Acoustic Stimulator No   Contractions Not present   Nonstress Test Baby A   Variability Moderate   Decels None   Accels Present   Baseline    Interpretation Baby A   Nonstress Test Interpretation Reactive   Overall Impression Reassuring   Comments Dr. Ji reviewed pt strip. Orders recvd to dc pthome.   Nonstress Test Baby B   Variability Moderate   Decels None   Accels Present   Baseline    Interpretation Baby B   Nonstress Test Interpretation Reactive   Overall Impression Reassuring   Comments Dr. Ji reviewed pt strip orders recvd to dc pt home.

## 2023-12-04 NOTE — NURSING
Orders recvd to send pt home with 24 hour urine collection kit and orders to return urine at NST appointment at 1130. Pt instructed to start collection at 1000 am on 12/6/23   Breath Sounds equal & clear to percussion & auscultation, no accessory muscle use

## 2023-12-07 ENCOUNTER — HOSPITAL ENCOUNTER (OUTPATIENT)
Facility: HOSPITAL | Age: 38
Discharge: HOME | End: 2023-12-07
Attending: SPECIALIST | Admitting: SPECIALIST
Payer: MEDICAID

## 2023-12-07 VITALS — SYSTOLIC BLOOD PRESSURE: 139 MMHG | DIASTOLIC BLOOD PRESSURE: 97 MMHG | HEART RATE: 76 BPM

## 2023-12-07 DIAGNOSIS — O30.002 TWIN GESTATION IN SECOND TRIMESTER: ICD-10-CM

## 2023-12-07 LAB
ALBUMIN SERPL BCP-MCNC: 3.4 G/DL (ref 3.5–5.2)
ALP SERPL-CCNC: 118 U/L (ref 55–135)
ALT SERPL W/O P-5'-P-CCNC: 8 U/L (ref 10–44)
ANION GAP SERPL CALC-SCNC: 6 MMOL/L (ref 8–16)
AST SERPL-CCNC: 16 U/L (ref 10–40)
BASOPHILS # BLD AUTO: 0.02 K/UL (ref 0–0.2)
BASOPHILS NFR BLD: 0.4 % (ref 0–1.9)
BILIRUB SERPL-MCNC: 0.2 MG/DL (ref 0.1–1)
BUN SERPL-MCNC: 6 MG/DL (ref 6–20)
CALCIUM SERPL-MCNC: 8.9 MG/DL (ref 8.7–10.5)
CHLORIDE SERPL-SCNC: 106 MMOL/L (ref 95–110)
CO2 SERPL-SCNC: 24 MMOL/L (ref 23–29)
CREAT SERPL-MCNC: 0.5 MG/DL (ref 0.5–1.4)
DIFFERENTIAL METHOD: ABNORMAL
EOSINOPHIL # BLD AUTO: 0 K/UL (ref 0–0.5)
EOSINOPHIL NFR BLD: 0.6 % (ref 0–8)
ERYTHROCYTE [DISTWIDTH] IN BLOOD BY AUTOMATED COUNT: 19.6 % (ref 11.5–14.5)
EST. GFR  (NO RACE VARIABLE): >60 ML/MIN/1.73 M^2
GLUCOSE SERPL-MCNC: 78 MG/DL (ref 70–110)
HCT VFR BLD AUTO: 35.8 % (ref 37–48.5)
HGB BLD-MCNC: 11.3 G/DL (ref 12–16)
IMM GRANULOCYTES # BLD AUTO: 0.02 K/UL (ref 0–0.04)
IMM GRANULOCYTES NFR BLD AUTO: 0.4 % (ref 0–0.5)
LYMPHOCYTES # BLD AUTO: 1.3 K/UL (ref 1–4.8)
LYMPHOCYTES NFR BLD: 27.1 % (ref 18–48)
MCH RBC QN AUTO: 28.8 PG (ref 27–31)
MCHC RBC AUTO-ENTMCNC: 31.6 G/DL (ref 32–36)
MCV RBC AUTO: 91 FL (ref 82–98)
MONOCYTES # BLD AUTO: 0.6 K/UL (ref 0.3–1)
MONOCYTES NFR BLD: 12.3 % (ref 4–15)
NEUTROPHILS # BLD AUTO: 2.9 K/UL (ref 1.8–7.7)
NEUTROPHILS NFR BLD: 59.2 % (ref 38–73)
NRBC BLD-RTO: 0 /100 WBC
PLATELET # BLD AUTO: 134 K/UL (ref 150–450)
PMV BLD AUTO: 9.3 FL (ref 9.2–12.9)
POTASSIUM SERPL-SCNC: 4.2 MMOL/L (ref 3.5–5.1)
PROT 24H UR-MRATE: 220 MG/SPEC (ref 6–100)
PROT SERPL-MCNC: 6.5 G/DL (ref 6–8.4)
PROT UR-MCNC: 11 MG/DL (ref 0–15)
RBC # BLD AUTO: 3.92 M/UL (ref 4–5.4)
SODIUM SERPL-SCNC: 136 MMOL/L (ref 136–145)
URINE COLLECTION DURATION: 24 HR
URINE VOLUME: 2000 ML
WBC # BLD AUTO: 4.94 K/UL (ref 3.9–12.7)

## 2023-12-07 PROCEDURE — 85025 COMPLETE CBC W/AUTO DIFF WBC: CPT | Performed by: SPECIALIST

## 2023-12-07 PROCEDURE — 80053 COMPREHEN METABOLIC PANEL: CPT | Performed by: SPECIALIST

## 2023-12-07 PROCEDURE — 59025 FETAL NON-STRESS TEST: CPT

## 2023-12-07 PROCEDURE — 84156 ASSAY OF PROTEIN URINE: CPT | Performed by: SPECIALIST

## 2023-12-07 PROCEDURE — 25000003 PHARM REV CODE 250: Performed by: SPECIALIST

## 2023-12-07 PROCEDURE — 36415 COLL VENOUS BLD VENIPUNCTURE: CPT | Performed by: SPECIALIST

## 2023-12-07 RX ORDER — BUTALBITAL, ACETAMINOPHEN AND CAFFEINE 50; 325; 40 MG/1; MG/1; MG/1
1 TABLET ORAL EVERY 4 HOURS PRN
Status: DISCONTINUED | OUTPATIENT
Start: 2023-12-07 | End: 2023-12-07 | Stop reason: HOSPADM

## 2023-12-07 RX ADMIN — BUTALBITAL, ACETAMINOPHEN AND CAFFEINE 1 TABLET: 325; 50; 40 TABLET ORAL at 12:12

## 2023-12-07 NOTE — NURSING
1211 Dr. Ji notified of bps and that pt reports having a migraine. Pt states she takes fiorciet but doesn't have her prescription with her. Ok to order med for pt to take now.  Waiting on 24hr urine results.  1240 Pt wants to leave before lab results back. Ok per Dr. Ji. Pt has appt with MD at 2pm today.

## 2023-12-11 ENCOUNTER — HOSPITAL ENCOUNTER (OUTPATIENT)
Facility: HOSPITAL | Age: 38
Discharge: HOME | End: 2023-12-11
Attending: SPECIALIST | Admitting: SPECIALIST
Payer: MEDICAID

## 2023-12-11 VITALS — SYSTOLIC BLOOD PRESSURE: 142 MMHG | DIASTOLIC BLOOD PRESSURE: 97 MMHG | HEART RATE: 82 BPM | RESPIRATION RATE: 16 BRPM

## 2023-12-11 DIAGNOSIS — O30.002 TWIN GESTATION IN SECOND TRIMESTER: ICD-10-CM

## 2023-12-11 PROCEDURE — 59025 FETAL NON-STRESS TEST: CPT

## 2023-12-11 PROCEDURE — 25000003 PHARM REV CODE 250: Performed by: SPECIALIST

## 2023-12-11 RX ADMIN — LABETALOL HYDROCHLORIDE 300 MG: 200 TABLET, FILM COATED ORAL at 03:12

## 2023-12-11 NOTE — DISCHARGE INSTRUCTIONS
Keep your scheduled appointment with your provider.    Call your Doctor if you have any of the following:  Temperature above 100 degrees  Nausea, vomiting and/or diarrhea  Severe headache, dizziness, or blurred vision  Notable increase in swelling of hands or feet  Notable swelling of face and lips  Difficulty, pain or burning with urination  Foul smelling vaginal discharge  Decreased fetal movement    Come to the hospital if you have any of the following symptoms:  Your water breaks  More than 4-6 contractions in 1 hour for 2 or more hours  Vaginal bleeding like a period    After 28 weeks, you should feel 10 distinct fetal movements within a 2 hour period.    It is recommended that you drink 1/2 a gallon of water each day.  Tea, Soda and Juice are  in addition to this.       Labetalol 300 @ am 300 @1300 200 @ night   PIH percautions

## 2023-12-14 ENCOUNTER — HOSPITAL ENCOUNTER (OUTPATIENT)
Facility: HOSPITAL | Age: 38
Discharge: HOME | End: 2023-12-14
Attending: SPECIALIST | Admitting: SPECIALIST
Payer: MEDICAID

## 2023-12-14 VITALS — DIASTOLIC BLOOD PRESSURE: 92 MMHG | HEART RATE: 67 BPM | SYSTOLIC BLOOD PRESSURE: 138 MMHG | RESPIRATION RATE: 18 BRPM

## 2023-12-14 DIAGNOSIS — O30.002 TWIN GESTATION IN SECOND TRIMESTER: ICD-10-CM

## 2023-12-14 PROCEDURE — 59025 FETAL NON-STRESS TEST: CPT

## 2023-12-18 ENCOUNTER — HOSPITAL ENCOUNTER (OUTPATIENT)
Facility: HOSPITAL | Age: 38
Discharge: HOME | End: 2023-12-19
Attending: SPECIALIST | Admitting: SPECIALIST
Payer: MEDICAID

## 2023-12-18 DIAGNOSIS — O30.002 TWIN GESTATION IN SECOND TRIMESTER: ICD-10-CM

## 2023-12-18 DIAGNOSIS — O13.3 GESTATIONAL HYPERTENSION, THIRD TRIMESTER: Primary | ICD-10-CM

## 2023-12-18 LAB
ALBUMIN SERPL BCP-MCNC: 3.4 G/DL (ref 3.5–5.2)
ALP SERPL-CCNC: 152 U/L (ref 55–135)
ALT SERPL W/O P-5'-P-CCNC: 12 U/L (ref 10–44)
ANION GAP SERPL CALC-SCNC: 11 MMOL/L (ref 8–16)
AST SERPL-CCNC: 26 U/L (ref 10–40)
BASOPHILS # BLD AUTO: 0.03 K/UL (ref 0–0.2)
BASOPHILS NFR BLD: 0.7 % (ref 0–1.9)
BILIRUB SERPL-MCNC: 0.3 MG/DL (ref 0.1–1)
BUN SERPL-MCNC: 7 MG/DL (ref 6–20)
CALCIUM SERPL-MCNC: 8.6 MG/DL (ref 8.7–10.5)
CHLORIDE SERPL-SCNC: 106 MMOL/L (ref 95–110)
CO2 SERPL-SCNC: 19 MMOL/L (ref 23–29)
CREAT SERPL-MCNC: 0.6 MG/DL (ref 0.5–1.4)
DIFFERENTIAL METHOD: ABNORMAL
EOSINOPHIL # BLD AUTO: 0 K/UL (ref 0–0.5)
EOSINOPHIL NFR BLD: 0.7 % (ref 0–8)
ERYTHROCYTE [DISTWIDTH] IN BLOOD BY AUTOMATED COUNT: 19.3 % (ref 11.5–14.5)
EST. GFR  (NO RACE VARIABLE): >60 ML/MIN/1.73 M^2
GLUCOSE SERPL-MCNC: 113 MG/DL (ref 70–110)
HCT VFR BLD AUTO: 37.8 % (ref 37–48.5)
HGB BLD-MCNC: 11.8 G/DL (ref 12–16)
IMM GRANULOCYTES # BLD AUTO: 0.02 K/UL (ref 0–0.04)
IMM GRANULOCYTES NFR BLD AUTO: 0.5 % (ref 0–0.5)
LYMPHOCYTES # BLD AUTO: 1.4 K/UL (ref 1–4.8)
LYMPHOCYTES NFR BLD: 34 % (ref 18–48)
MCH RBC QN AUTO: 28.2 PG (ref 27–31)
MCHC RBC AUTO-ENTMCNC: 31.2 G/DL (ref 32–36)
MCV RBC AUTO: 90 FL (ref 82–98)
MONOCYTES # BLD AUTO: 0.4 K/UL (ref 0.3–1)
MONOCYTES NFR BLD: 9.2 % (ref 4–15)
NEUTROPHILS # BLD AUTO: 2.3 K/UL (ref 1.8–7.7)
NEUTROPHILS NFR BLD: 54.9 % (ref 38–73)
NRBC BLD-RTO: 0 /100 WBC
PLATELET # BLD AUTO: 127 K/UL (ref 150–450)
PMV BLD AUTO: 9.8 FL (ref 9.2–12.9)
POTASSIUM SERPL-SCNC: 3.7 MMOL/L (ref 3.5–5.1)
PROT SERPL-MCNC: 6.3 G/DL (ref 6–8.4)
RBC # BLD AUTO: 4.18 M/UL (ref 4–5.4)
SODIUM SERPL-SCNC: 136 MMOL/L (ref 136–145)
WBC # BLD AUTO: 4.24 K/UL (ref 3.9–12.7)

## 2023-12-18 PROCEDURE — 63600175 PHARM REV CODE 636 W HCPCS: Performed by: SPECIALIST

## 2023-12-18 PROCEDURE — 25000003 PHARM REV CODE 250: Performed by: SPECIALIST

## 2023-12-18 PROCEDURE — 80053 COMPREHEN METABOLIC PANEL: CPT | Performed by: SPECIALIST

## 2023-12-18 PROCEDURE — 59025 FETAL NON-STRESS TEST: CPT

## 2023-12-18 PROCEDURE — 36415 COLL VENOUS BLD VENIPUNCTURE: CPT | Performed by: SPECIALIST

## 2023-12-18 PROCEDURE — 85025 COMPLETE CBC W/AUTO DIFF WBC: CPT | Performed by: SPECIALIST

## 2023-12-18 RX ORDER — LABETALOL 200 MG/1
200 TABLET, FILM COATED ORAL NIGHTLY
Status: DISCONTINUED | OUTPATIENT
Start: 2023-12-18 | End: 2023-12-18

## 2023-12-18 RX ORDER — ZOLPIDEM TARTRATE 5 MG/1
5 TABLET ORAL NIGHTLY PRN
Status: DISCONTINUED | OUTPATIENT
Start: 2023-12-18 | End: 2023-12-19 | Stop reason: HOSPADM

## 2023-12-18 RX ORDER — NIFEDIPINE 30 MG/1
30 TABLET, EXTENDED RELEASE ORAL DAILY
Status: DISCONTINUED | OUTPATIENT
Start: 2023-12-18 | End: 2023-12-18

## 2023-12-18 RX ORDER — FAMOTIDINE 20 MG/1
40 TABLET, FILM COATED ORAL 2 TIMES DAILY
Status: DISCONTINUED | OUTPATIENT
Start: 2023-12-18 | End: 2023-12-19 | Stop reason: HOSPADM

## 2023-12-18 RX ORDER — SODIUM CHLORIDE, SODIUM LACTATE, POTASSIUM CHLORIDE, CALCIUM CHLORIDE 600; 310; 30; 20 MG/100ML; MG/100ML; MG/100ML; MG/100ML
INJECTION, SOLUTION INTRAVENOUS CONTINUOUS
Status: DISCONTINUED | OUTPATIENT
Start: 2023-12-18 | End: 2023-12-19 | Stop reason: HOSPADM

## 2023-12-18 RX ORDER — BETAMETHASONE SODIUM PHOSPHATE AND BETAMETHASONE ACETATE 3; 3 MG/ML; MG/ML
12 INJECTION, SUSPENSION INTRA-ARTICULAR; INTRALESIONAL; INTRAMUSCULAR; SOFT TISSUE ONCE
Status: COMPLETED | OUTPATIENT
Start: 2023-12-18 | End: 2023-12-18

## 2023-12-18 RX ORDER — NIFEDIPINE 30 MG/1
30 TABLET, EXTENDED RELEASE ORAL DAILY
Status: DISCONTINUED | OUTPATIENT
Start: 2023-12-18 | End: 2023-12-19 | Stop reason: HOSPADM

## 2023-12-18 RX ORDER — HYDRALAZINE HYDROCHLORIDE 20 MG/ML
10 INJECTION INTRAMUSCULAR; INTRAVENOUS ONCE AS NEEDED
Status: DISCONTINUED | OUTPATIENT
Start: 2023-12-18 | End: 2023-12-19 | Stop reason: HOSPADM

## 2023-12-18 RX ORDER — NIFEDIPINE 30 MG/1
60 TABLET, EXTENDED RELEASE ORAL DAILY
Status: DISCONTINUED | OUTPATIENT
Start: 2023-12-19 | End: 2023-12-19 | Stop reason: HOSPADM

## 2023-12-18 RX ORDER — HYDRALAZINE HYDROCHLORIDE 20 MG/ML
5 INJECTION INTRAMUSCULAR; INTRAVENOUS ONCE AS NEEDED
Status: DISCONTINUED | OUTPATIENT
Start: 2023-12-18 | End: 2023-12-19 | Stop reason: HOSPADM

## 2023-12-18 RX ADMIN — BETAMETHASONE ACETATE AND BETAMETHASONE SODIUM PHOSPHATE 12 MG: 3; 3 INJECTION, SUSPENSION INTRA-ARTICULAR; INTRALESIONAL; INTRAMUSCULAR; SOFT TISSUE at 03:12

## 2023-12-18 RX ADMIN — FAMOTIDINE 40 MG: 20 TABLET ORAL at 09:12

## 2023-12-18 RX ADMIN — LABETALOL HYDROCHLORIDE 300 MG: 200 TABLET, FILM COATED ORAL at 09:12

## 2023-12-18 RX ADMIN — SODIUM CHLORIDE, SODIUM LACTATE, POTASSIUM CHLORIDE, AND CALCIUM CHLORIDE 300 ML: .6; .31; .03; .02 INJECTION, SOLUTION INTRAVENOUS at 06:12

## 2023-12-18 RX ADMIN — NIFEDIPINE 30 MG: 30 TABLET, FILM COATED, EXTENDED RELEASE ORAL at 06:12

## 2023-12-18 RX ADMIN — ZOLPIDEM TARTRATE 5 MG: 5 TABLET, COATED ORAL at 09:12

## 2023-12-19 VITALS
HEART RATE: 86 BPM | SYSTOLIC BLOOD PRESSURE: 143 MMHG | OXYGEN SATURATION: 99 % | HEIGHT: 62 IN | RESPIRATION RATE: 18 BRPM | BODY MASS INDEX: 27.79 KG/M2 | TEMPERATURE: 98 F | WEIGHT: 151 LBS | DIASTOLIC BLOOD PRESSURE: 91 MMHG

## 2023-12-19 DIAGNOSIS — O30.009 PREGNANCY, TWINS: Primary | ICD-10-CM

## 2023-12-19 DIAGNOSIS — I10 HYPERTENSION: ICD-10-CM

## 2023-12-19 LAB
ERYTHROCYTE [DISTWIDTH] IN BLOOD BY AUTOMATED COUNT: 19.2 % (ref 11.5–14.5)
HCT VFR BLD AUTO: 37.4 % (ref 37–48.5)
HGB BLD-MCNC: 12 G/DL (ref 12–16)
MCH RBC QN AUTO: 29.1 PG (ref 27–31)
MCHC RBC AUTO-ENTMCNC: 32.1 G/DL (ref 32–36)
MCV RBC AUTO: 91 FL (ref 82–98)
PLATELET # BLD AUTO: 147 K/UL (ref 150–450)
PMV BLD AUTO: 10.4 FL (ref 9.2–12.9)
PROT 24H UR-MRATE: 250 MG/SPEC (ref 6–100)
PROT UR-MCNC: 10 MG/DL (ref 0–15)
RBC # BLD AUTO: 4.13 M/UL (ref 4–5.4)
URINE COLLECTION DURATION: 24 HR
URINE VOLUME: 2500 ML
WBC # BLD AUTO: 7.86 K/UL (ref 3.9–12.7)

## 2023-12-19 PROCEDURE — 85027 COMPLETE CBC AUTOMATED: CPT | Performed by: SPECIALIST

## 2023-12-19 PROCEDURE — 99211 OFF/OP EST MAY X REQ PHY/QHP: CPT | Mod: 25

## 2023-12-19 PROCEDURE — 25000003 PHARM REV CODE 250: Performed by: SPECIALIST

## 2023-12-19 PROCEDURE — 84156 ASSAY OF PROTEIN URINE: CPT | Performed by: SPECIALIST

## 2023-12-19 PROCEDURE — 36415 COLL VENOUS BLD VENIPUNCTURE: CPT | Performed by: SPECIALIST

## 2023-12-19 PROCEDURE — 87081 CULTURE SCREEN ONLY: CPT | Performed by: SPECIALIST

## 2023-12-19 RX ORDER — LABETALOL 200 MG/1
300 TABLET, FILM COATED ORAL 3 TIMES DAILY
Qty: 135 TABLET | Refills: 11 | Status: ON HOLD | OUTPATIENT
Start: 2023-12-19 | End: 2023-12-31

## 2023-12-19 RX ORDER — ACETAMINOPHEN 500 MG
1000 TABLET ORAL EVERY 8 HOURS PRN
Status: DISCONTINUED | OUTPATIENT
Start: 2023-12-19 | End: 2023-12-19

## 2023-12-19 RX ORDER — BUTALBITAL, ACETAMINOPHEN AND CAFFEINE 50; 325; 40 MG/1; MG/1; MG/1
1 TABLET ORAL EVERY 4 HOURS PRN
Status: DISCONTINUED | OUTPATIENT
Start: 2023-12-19 | End: 2023-12-19 | Stop reason: HOSPADM

## 2023-12-19 RX ORDER — NIFEDIPINE 30 MG/1
60 TABLET, EXTENDED RELEASE ORAL 2 TIMES DAILY
Qty: 120 TABLET | Refills: 11 | Status: ON HOLD | OUTPATIENT
Start: 2023-12-19 | End: 2024-01-04 | Stop reason: HOSPADM

## 2023-12-19 RX ADMIN — ACETAMINOPHEN 1000 MG: 500 TABLET ORAL at 05:12

## 2023-12-19 RX ADMIN — BUTALBITAL, ACETAMINOPHEN AND CAFFEINE 1 TABLET: 325; 50; 40 TABLET ORAL at 02:12

## 2023-12-19 RX ADMIN — LABETALOL HYDROCHLORIDE 300 MG: 200 TABLET, FILM COATED ORAL at 02:12

## 2023-12-19 RX ADMIN — LABETALOL HYDROCHLORIDE 300 MG: 200 TABLET, FILM COATED ORAL at 09:12

## 2023-12-19 RX ADMIN — NIFEDIPINE 60 MG: 30 TABLET, FILM COATED, EXTENDED RELEASE ORAL at 05:12

## 2023-12-19 NOTE — ASSESSMENT & PLAN NOTE
Intrauterine pregnancy at 34 weeks' gestation twin gestation.  Ultrasound demonstrates cephalic cephalic presentation.  Patient has had a history of 5 vaginal births and wished to proceed with vaginal birth for twin gestation.  At this point she has been followed very closely for gestational hypertension.  She is on labetalol 300 mg 3 times a day and Procardia XL 60 mg2 times a day.  Patient feels very good.  We are watching her labs closely as well as her blood pressures.  Blood pressures have been somewhat elevated at times but not a greater than 160/110 on these medications.  24 hour urine is pending and repeat CBC is pending.  Patient could possibly go home and continue resting at home.  She has checked her blood pressures at home and they do not seem to be too elevated.  Diastolics in the 90s.  Systolics 140s to 150s.  At this point she is received Celestone and she did receive a rescue dose secondary to the blood pressures starting to rise yesterday.  If patient goes into labor would not stop labor at this point.  May consider induction of labor if platelets drop under 120.  Or 24 hour urine has significant proteinuria.  Otherwise continue twin gestation on these medications and frequent  testing with induction of labor at 36 weeks if she makes it.

## 2023-12-19 NOTE — SUBJECTIVE & OBJECTIVE
Obstetric HPI:  38-year-old  6 [id number] pregnant currently with twins was observed overnight for elevated blood pressures.  No blood pressures were greater than 160/110.  Patient has been followed for chronic hypertension and has been on labetalol and Procardia.  She is currently now on 300 of labetalol 3 times a day and 60 of Procardia 3 times a day.  Patient says she feels well.  She does not have any headaches or visual changes.  She says she occasionally checks her blood pressure at home and occasionally has diastolics in the 90s but nothing higher than that.  I have discussed with the patient chronic hypertension with the twins and preeclampsia.  Labs have been within normal limits as well as proteinuria has been negative.  We are waiting on 24 hour urine currently.  Also repeating a CBC because she does have some mild gestational thrombocytopenia I suspect.  However if the platelets dropped anymore I would recommend induction of labor this week.  Last platelets were 127, 1 week ago they were 134.  We will see what they are this morning.        OB History    Para Term  AB Living   6 5 3 2 0 0   SAB IAB Ectopic Multiple Live Births   0 0 0 0 0      # Outcome Date GA Lbr Lamin/2nd Weight Sex Delivery Anes PTL Lv   6 Current            5 Term  40w0d  2.92 kg (6 lb 7 oz) F       4   32w0d  1.814 kg (4 lb)        3 Term  40w0d  2.835 kg (6 lb 4 oz) F       2   32w0d  2.268 kg (5 lb) F       1 Term  40w0d  2.92 kg (6 lb 7 oz) F         Past Medical History:   Diagnosis Date    Anemia     GERD (gastroesophageal reflux disease)     Gestational hypertension    Patient has previously treated syphilis.  RPR  one-to-one.  Past Surgical History:   Procedure Laterality Date    CYSTECTOMY      right breast cyst removal in  as baby       PTA Medications   Medication Sig    aspirin 81 MG Chew Take 81 mg by mouth once daily.    butalbital-acetaminophen-caffeine  -40 mg (FIORICET, ESGIC) -40 mg per tablet take 1 tablet by mouth every 4 to 6 hours as needed for headaches    docusate sodium (COLACE) 100 MG capsule Take 100 mg by mouth 2 (two) times daily.    famotidine (PEPCID) 40 MG tablet take 1 tablet by mouth twice daily    labetaloL (NORMODYNE) 200 MG tablet Take 1 tablet (200 mg total) by mouth 2 (two) times daily.    metroNIDAZOLE (FLAGYL) 500 MG tablet Take 1 tablet (500 mg total) by mouth every 12 (twelve) hours.    multivitamin capsule Take 1 capsule by mouth once daily.    NIFEdipine (PROCARDIA-XL) 30 MG (OSM) 24 hr tablet Take 1 tablet (30 mg total) by mouth once daily.    pantoprazole (PROTONIX) 40 MG tablet Take 40 mg by mouth once daily.    ferrous gluconate (FERGON) 324 MG tablet Take 225 mg by mouth daily with breakfast.    traMADoL (ULTRAM) 50 mg tablet Take 50 mg by mouth.       Review of patient's allergies indicates:  No Known Allergies     Family History    None       Tobacco Use    Smoking status: Never    Smokeless tobacco: Not on file   Substance and Sexual Activity    Alcohol use: Not Currently     Comment: rarely    Drug use: Never    Sexual activity: Yes     Partners: Male     Review of Systems   Objective:     Vital Signs (Most Recent):  Temp: 97.4 °F (36.3 °C) (12/18/23 2334)  Pulse: 96 (12/19/23 0534)  Resp: 16 (12/19/23 0237)  BP: 132/73 (12/19/23 0534)  SpO2: 98 % (12/18/23 1942) Vital Signs (24h Range):  Temp:  [97.4 °F (36.3 °C)-97.8 °F (36.6 °C)] 97.4 °F (36.3 °C)  Pulse:  [57-96] 96  Resp:  [16-18] 16  SpO2:  [18 %-100 %] 98 %  BP: (121-162)/() 132/73     Weight: 68.5 kg (151 lb)  Body mass index is 27.62 kg/m².    FHT:  A and B have both been reactive.  TOCO:  Patient has regular episodic contractions when she comes for her nonstress tests.  These are nonpainful to her.     Physical Exam     Cervix:  Dilation:  Last cervical exam was proximally 2 cm    Effacement:  50% effaced -3 statiion   Presentation: Cephalic cephalic  on ultrasound last night.     Significant Labs:  Lab Results   Component Value Date    RAMIRO MASSEY POS 11/17/2023       CBC:   Recent Labs   Lab 12/18/23  1407   WBC 4.24   RBC 4.18   HGB 11.8*   HCT 37.8   *   MCV 90   MCH 28.2   MCHC 31.2*     CMP:   Recent Labs   Lab 12/18/23  1407   *   CALCIUM 8.6*   ALBUMIN 3.4*   PROT 6.3      K 3.7   CO2 19*      BUN 7   CREATININE 0.6   ALKPHOS 152*   ALT 12   AST 26   BILITOT 0.3   Awaiting 24 hour urine for protein and repeat CBC this morning  I have personallly reviewed all pertinent lab results from the last 24 hours.

## 2023-12-19 NOTE — H&P
[facility]  Obstetrics  History & Physical    Patient Name: Lorena Green  MRN: [id number]  Admission Date: 2023  Primary Care Provider: Ani, Primary Doctor    Subjective:     Principal Problem:Gestational hypertension, third trimester    History of Present Illness:  No notes on file    Obstetric HPI:  38-year-old  6 [id number] pregnant currently with twins was observed overnight for elevated blood pressures.  No blood pressures were greater than 160/110.  Patient has been followed for chronic hypertension and has been on labetalol and Procardia.  She is currently now on 300 of labetalol 3 times a day and 60 of Procardia 3 times a day.  Patient says she feels well.  She does not have any headaches or visual changes.  She says she occasionally checks her blood pressure at home and occasionally has diastolics in the 90s but nothing higher than that.  I have discussed with the patient chronic hypertension with the twins and preeclampsia.  Labs have been within normal limits as well as proteinuria has been negative.  We are waiting on 24 hour urine currently.  Also repeating a CBC because she does have some mild gestational thrombocytopenia I suspect.  However if the platelets dropped anymore I would recommend induction of labor this week.  Last platelets were 127, 1 week ago they were 134.  We will see what they are this morning.        OB History    Para Term  AB Living   6 5 3 2 0 0   SAB IAB Ectopic Multiple Live Births   0 0 0 0 0      # Outcome Date GA Lbr Lamin/2nd Weight Sex Delivery Anes PTL Lv   6 Current            5 Term  40w0d  2.92 kg (6 lb 7 oz) F       4   32w0d  1.814 kg (4 lb)        3 Term  40w0d  2.835 kg (6 lb 4 oz) F       2   32w0d  2.268 kg (5 lb) F       1 Term  40w0d  2.92 kg (6 lb 7 oz) F         Past Medical History:   Diagnosis Date    Anemia     GERD (gastroesophageal reflux disease)     Gestational hypertension     Patient has previously treated syphilis.  RPR  one-to-one.  Past Surgical History:   Procedure Laterality Date    CYSTECTOMY      right breast cyst removal in 1985 as baby       PTA Medications   Medication Sig    aspirin 81 MG Chew Take 81 mg by mouth once daily.    butalbital-acetaminophen-caffeine -40 mg (FIORICET, ESGIC) -40 mg per tablet take 1 tablet by mouth every 4 to 6 hours as needed for headaches    docusate sodium (COLACE) 100 MG capsule Take 100 mg by mouth 2 (two) times daily.    famotidine (PEPCID) 40 MG tablet take 1 tablet by mouth twice daily    labetaloL (NORMODYNE) 200 MG tablet Take 1 tablet (200 mg total) by mouth 2 (two) times daily.    metroNIDAZOLE (FLAGYL) 500 MG tablet Take 1 tablet (500 mg total) by mouth every 12 (twelve) hours.    multivitamin capsule Take 1 capsule by mouth once daily.    NIFEdipine (PROCARDIA-XL) 30 MG (OSM) 24 hr tablet Take 1 tablet (30 mg total) by mouth once daily.    pantoprazole (PROTONIX) 40 MG tablet Take 40 mg by mouth once daily.    ferrous gluconate (FERGON) 324 MG tablet Take 225 mg by mouth daily with breakfast.    traMADoL (ULTRAM) 50 mg tablet Take 50 mg by mouth.       Review of patient's allergies indicates:  No Known Allergies     Family History    None       Tobacco Use    Smoking status: Never    Smokeless tobacco: Not on file   Substance and Sexual Activity    Alcohol use: Not Currently     Comment: rarely    Drug use: Never    Sexual activity: Yes     Partners: Male     Review of Systems   Objective:     Vital Signs (Most Recent):  Temp: 97.4 °F (36.3 °C) (12/18/23 2334)  Pulse: 96 (12/19/23 0534)  Resp: 16 (12/19/23 0237)  BP: 132/73 (12/19/23 0534)  SpO2: 98 % (12/18/23 1942) Vital Signs (24h Range):  Temp:  [97.4 °F (36.3 °C)-97.8 °F (36.6 °C)] 97.4 °F (36.3 °C)  Pulse:  [57-96] 96  Resp:  [16-18] 16  SpO2:  [18 %-100 %] 98 %  BP: (121-162)/() 132/73     Weight: 68.5 kg (151 lb)  Body mass index is 27.62  kg/m².    FHT:  A and B have both been reactive.  TOCO:  Patient has regular episodic contractions when she comes for her nonstress tests.  These are nonpainful to her.     Physical Exam     Cervix:  Dilation:  Last cervical exam was proximally 2 cm    Effacement:  50% effaced -3 statiion   Presentation: Cephalic cephalic on ultrasound last night.     Significant Labs:  Lab Results   Component Value Date    [facility] B POS 2023       CBC:   Recent Labs   Lab 23  1407   WBC 4.24   RBC 4.18   HGB 11.8*   HCT 37.8   *   MCV 90   MCH 28.2   MCHC 31.2*     CMP:   Recent Labs   Lab 23  1407   *   CALCIUM 8.6*   ALBUMIN 3.4*   PROT 6.3      K 3.7   CO2 19*      BUN 7   CREATININE 0.6   ALKPHOS 152*   ALT 12   AST 26   BILITOT 0.3   Awaiting 24 hour urine for protein and repeat CBC this morning  I have personallly reviewed all pertinent lab results from the last 24 hours.  Assessment/Plan:     38 y.o. female  at 34w1d for:    * Gestational hypertension, third trimester  Intrauterine pregnancy at 34 weeks' gestation twin gestation.  Ultrasound demonstrates cephalic cephalic presentation.  Patient has had a history of 5 vaginal births and wished to proceed with vaginal birth for twin gestation.  At this point she has been followed very closely for gestational hypertension.  She is on labetalol 300 mg 3 times a day and Procardia XL 60 mg2 times a day.  Patient feels very good.  We are watching her labs closely as well as her blood pressures.  Blood pressures have been somewhat elevated at times but not a greater than 160/110 on these medications.  24 hour urine is pending and repeat CBC is pending.  Patient could possibly go home and continue resting at home.  She has checked her blood pressures at home and they do not seem to be too elevated.  Diastolics in the 90s.  Systolics 140s to 150s.  At this point she is received Celestone and she did receive a rescue dose secondary to  the blood pressures starting to rise yesterday.  If patient goes into labor would not stop labor at this point.  May consider induction of labor if platelets drop under 120.  Or 24 hour urine has significant proteinuria.  Otherwise continue twin gestation on these medications and frequent  testing with induction of labor at 36 weeks if she makes it.              Ailyn Ji MD  Obstetrics  [facility]

## 2023-12-19 NOTE — DISCHARGE INSTRUCTIONS
Keep your scheduled appointment with your provider.    Call your Doctor if you have any of the following:  Temperature above 100 degrees  Nausea, vomiting and/or diarrhea  Severe headache, dizziness, or blurred vision  Notable increase in swelling of hands or feet  Notable swelling of face and lips  Difficulty, pain or burning with urination  Foul smelling vaginal discharge  Decreased fetal movement    Come to the hospital if you have any of the following symptoms:  Your water breaks  More than 4-6 contractions in 1 hour for 2 or more hours  Vaginal bleeding like a period    After 28 weeks, you should feel 10 distinct fetal movements within a 2 hour period.    It is recommended that you drink 1/2 a gallon of water each day.  Tea, Soda and Juice are  in addition to this.    Return to LD for NST after taking 0900 dose of labetalol on Thursday.    Induction scheduled for January 3, 2024 at 0600

## 2023-12-19 NOTE — PLAN OF CARE
[facility]  Initial Discharge Assessment       Primary Care Provider: Ani, Primary Doctor    Admission Diagnosis: Twin gestation in second trimester [O30.002]    Admission Date: 12/18/2023  Expected Discharge Date: 12/19/2023    Pt is a 38-year-old female who arrived from home with Gestational hypertension, third trimester. Information verified as correct on facesheet. The assessment was completed at the patient's bedside.  Pt lives with dependent children. Pt does not have a Living Will or Advance Directive. The Pt is oriented to person, places, and times. PCP last seen December 8, 2023.  Pt denies Coumadin, dialysis, DME, HH, and has not been readmitted into the hospital in the last thirty days.  Pt is capable of performing ADLs without assistance. Pt drivers to and from medical appointments. Pt reports she takes medication as prescribed; pharmacy used [facility].  Pt verbalized plan to discharge home via family transport. Pt has no other needs to be addressed at this time. CM reviewed the chart and will continue to monitor.       Transition of Care Barriers: None    Payor: MEDICAID / Plan: [place] [employer] / Product Type: Managed Medicaid /     Extended Emergency Contact Information  Primary Emergency Contact: Milagro Feliz   [facility]  Home Phone: [phone]  Relation: Friend  Secondary Emergency Contact: Marco Antonio Green  Mobile Phone: [phone]  Relation: Mother  Preferred language: English   needed? No    Discharge Plan A: Home with family  Discharge Plan B: Home      [facility] [place] - [address]  [address]  [place] [address]  Phone: [phone] Fax: [phone]    [id number] - [place] - [address]  [address]  [place] [address]  Phone: [phone] Fax: [phone]      Initial Assessment (most recent)       Adult Discharge Assessment - 12/19/23 1251           Discharge Assessment    Assessment Type Discharge Planning Assessment     Confirmed/corrected address, phone number and insurance Yes     Confirmed Demographics Correct on Facesheet     Source of Information patient     When was your last doctors appointment? 12/08/23     Communicated XU with patient/caregiver Date not available/Unable to determine     Reason For Admission Gestational hypertension, third trimester     People in Home child(nixon), dependent     Facility Arrived From: appointment     Do you expect to return to your current living situation? Yes     Do you have help at home or someone to help you manage your care at home? No     Prior to hospitilization cognitive status: Alert/Oriented     Current cognitive status: Alert/Oriented     Walking or Climbing Stairs Difficulty no     Dressing/Bathing Difficulty no     Home Accessibility wheelchair accessible     Equipment Currently Used at Home none     Readmission within 30 days? No     Patient currently being followed by outpatient case management? No     Do you currently have service(s) that help you manage your care at home? No     Do you take prescription medications? Yes     Do you have prescription coverage? Yes     Coverage Payor: MEDICAID - [facility] -     Do you have any problems affording any of your prescribed medications? No     Is the patient taking medications as prescribed? yes     Who is going to help you get home at discharge? Marco Antonio Green/mother [phone]     How do you get to doctors appointments? car, drives self     Are you on dialysis? No     Do you take coumadin? No     Discharge Plan A Home with family     Discharge Plan B Home     DME Needed Upon Discharge  none     Discharge Plan discussed with: Patient     Transition of Care Barriers None

## 2023-12-21 ENCOUNTER — HOSPITAL ENCOUNTER (OUTPATIENT)
Facility: HOSPITAL | Age: 38
Discharge: HOME | End: 2023-12-21
Attending: SPECIALIST | Admitting: SPECIALIST
Payer: MEDICAID

## 2023-12-21 VITALS — SYSTOLIC BLOOD PRESSURE: 137 MMHG | DIASTOLIC BLOOD PRESSURE: 91 MMHG | RESPIRATION RATE: 18 BRPM | HEART RATE: 77 BPM

## 2023-12-21 DIAGNOSIS — O30.002 TWIN GESTATION IN SECOND TRIMESTER: ICD-10-CM

## 2023-12-21 PROCEDURE — 59025 FETAL NON-STRESS TEST: CPT

## 2023-12-22 LAB — BACTERIA SPEC AEROBE CULT: NORMAL

## 2023-12-26 ENCOUNTER — HOSPITAL ENCOUNTER (OUTPATIENT)
Facility: HOSPITAL | Age: 38
Discharge: HOME | End: 2023-12-26
Attending: SPECIALIST | Admitting: OBSTETRICS & GYNECOLOGY
Payer: MEDICAID

## 2023-12-26 VITALS — SYSTOLIC BLOOD PRESSURE: 138 MMHG | HEART RATE: 72 BPM | RESPIRATION RATE: 18 BRPM | DIASTOLIC BLOOD PRESSURE: 95 MMHG

## 2023-12-26 DIAGNOSIS — O30.002 TWIN GESTATION IN SECOND TRIMESTER: ICD-10-CM

## 2023-12-26 LAB
ALBUMIN SERPL BCP-MCNC: 3.5 G/DL (ref 3.5–5.2)
ALP SERPL-CCNC: 166 U/L (ref 55–135)
ALT SERPL W/O P-5'-P-CCNC: 14 U/L (ref 10–44)
ANION GAP SERPL CALC-SCNC: 6 MMOL/L (ref 8–16)
AST SERPL-CCNC: 24 U/L (ref 10–40)
BASOPHILS # BLD AUTO: 0.03 K/UL (ref 0–0.2)
BASOPHILS NFR BLD: 0.5 % (ref 0–1.9)
BILIRUB SERPL-MCNC: 0.2 MG/DL (ref 0.1–1)
BUN SERPL-MCNC: 13 MG/DL (ref 6–20)
CALCIUM SERPL-MCNC: 9 MG/DL (ref 8.7–10.5)
CHLORIDE SERPL-SCNC: 104 MMOL/L (ref 95–110)
CO2 SERPL-SCNC: 24 MMOL/L (ref 23–29)
CREAT SERPL-MCNC: 0.6 MG/DL (ref 0.5–1.4)
CREAT UR-MCNC: 61.7 MG/DL (ref 15–325)
DIFFERENTIAL METHOD: ABNORMAL
EOSINOPHIL # BLD AUTO: 0 K/UL (ref 0–0.5)
EOSINOPHIL NFR BLD: 0.7 % (ref 0–8)
ERYTHROCYTE [DISTWIDTH] IN BLOOD BY AUTOMATED COUNT: 19 % (ref 11.5–14.5)
EST. GFR  (NO RACE VARIABLE): >60 ML/MIN/1.73 M^2
GLUCOSE SERPL-MCNC: 84 MG/DL (ref 70–110)
HCT VFR BLD AUTO: 40.4 % (ref 37–48.5)
HGB BLD-MCNC: 12.9 G/DL (ref 12–16)
IMM GRANULOCYTES # BLD AUTO: 0.04 K/UL (ref 0–0.04)
IMM GRANULOCYTES NFR BLD AUTO: 0.7 % (ref 0–0.5)
LYMPHOCYTES # BLD AUTO: 1.8 K/UL (ref 1–4.8)
LYMPHOCYTES NFR BLD: 30.1 % (ref 18–48)
MCH RBC QN AUTO: 28.9 PG (ref 27–31)
MCHC RBC AUTO-ENTMCNC: 31.9 G/DL (ref 32–36)
MCV RBC AUTO: 90 FL (ref 82–98)
MONOCYTES # BLD AUTO: 0.7 K/UL (ref 0.3–1)
MONOCYTES NFR BLD: 12.2 % (ref 4–15)
NEUTROPHILS # BLD AUTO: 3.3 K/UL (ref 1.8–7.7)
NEUTROPHILS NFR BLD: 55.8 % (ref 38–73)
NRBC BLD-RTO: 0 /100 WBC
PLATELET # BLD AUTO: 149 K/UL (ref 150–450)
PMV BLD AUTO: 10.5 FL (ref 9.2–12.9)
POTASSIUM SERPL-SCNC: 4.2 MMOL/L (ref 3.5–5.1)
PROT SERPL-MCNC: 6.6 G/DL (ref 6–8.4)
PROT UR-MCNC: 13 MG/DL (ref 6–15)
PROT/CREAT UR: 0.21 MG/G{CREAT} (ref 0–0.2)
RBC # BLD AUTO: 4.47 M/UL (ref 4–5.4)
SODIUM SERPL-SCNC: 134 MMOL/L (ref 136–145)
WBC # BLD AUTO: 5.82 K/UL (ref 3.9–12.7)

## 2023-12-26 PROCEDURE — 80053 COMPREHEN METABOLIC PANEL: CPT | Performed by: SPECIALIST

## 2023-12-26 PROCEDURE — 85025 COMPLETE CBC W/AUTO DIFF WBC: CPT | Performed by: SPECIALIST

## 2023-12-26 PROCEDURE — 82570 ASSAY OF URINE CREATININE: CPT | Performed by: SPECIALIST

## 2023-12-26 PROCEDURE — 59025 FETAL NON-STRESS TEST: CPT

## 2023-12-26 PROCEDURE — 36415 COLL VENOUS BLD VENIPUNCTURE: CPT | Performed by: SPECIALIST

## 2023-12-28 ENCOUNTER — ANESTHESIA (OUTPATIENT)
Dept: OBSTETRICS AND GYNECOLOGY | Facility: HOSPITAL | Age: 38
End: 2023-12-28

## 2023-12-28 ENCOUNTER — HOSPITAL ENCOUNTER (OUTPATIENT)
Facility: HOSPITAL | Age: 38
Discharge: HOME | End: 2023-12-28
Attending: OBSTETRICS & GYNECOLOGY | Admitting: SPECIALIST
Payer: MEDICAID

## 2023-12-28 VITALS — SYSTOLIC BLOOD PRESSURE: 133 MMHG | DIASTOLIC BLOOD PRESSURE: 94 MMHG | RESPIRATION RATE: 17 BRPM | HEART RATE: 75 BPM

## 2023-12-28 DIAGNOSIS — O30.002 TWIN GESTATION IN SECOND TRIMESTER: ICD-10-CM

## 2023-12-28 PROCEDURE — 59025 FETAL NON-STRESS TEST: CPT

## 2023-12-31 ENCOUNTER — ANESTHESIA (OUTPATIENT)
Dept: OBSTETRICS AND GYNECOLOGY | Facility: HOSPITAL | Age: 38
End: 2023-12-31
Payer: MEDICAID

## 2023-12-31 ENCOUNTER — ANESTHESIA EVENT (OUTPATIENT)
Dept: OBSTETRICS AND GYNECOLOGY | Facility: HOSPITAL | Age: 38
End: 2023-12-31
Payer: MEDICAID

## 2023-12-31 ENCOUNTER — HOSPITAL ENCOUNTER (INPATIENT)
Facility: HOSPITAL | Age: 38
LOS: 4 days | Discharge: HOME | End: 2024-01-04
Attending: SPECIALIST | Admitting: OBSTETRICS & GYNECOLOGY
Payer: COMMERCIAL

## 2023-12-31 DIAGNOSIS — R10.9 ABDOMINAL PAIN: ICD-10-CM

## 2023-12-31 PROBLEM — O60.03 PRETERM LABOR IN THIRD TRIMESTER: Status: ACTIVE | Noted: 2023-12-31

## 2023-12-31 PROBLEM — O30.049 TWIN DICHORIONIC DIAMNIOTIC PLACENTA: Status: ACTIVE | Noted: 2023-12-31

## 2023-12-31 LAB
ABO + RH BLD: NORMAL
AMPHET+METHAMPHET UR QL: NEGATIVE
BACTERIA #/AREA URNS HPF: NEGATIVE /HPF
BARBITURATES UR QL SCN>200 NG/ML: NEGATIVE
BASOPHILS # BLD AUTO: 0.01 K/UL (ref 0–0.2)
BASOPHILS NFR BLD: 0.2 % (ref 0–1.9)
BENZODIAZ UR QL SCN>200 NG/ML: NEGATIVE
BILIRUB UR QL STRIP: NEGATIVE
BLD GP AB SCN CELLS X3 SERPL QL: NORMAL
BUPRENORPHINE UR QL: NEGATIVE
BZE UR QL SCN: NEGATIVE
CANNABINOIDS UR QL SCN: NEGATIVE
CLARITY UR: CLEAR
COLOR UR: YELLOW
CREAT UR-MCNC: 101.4 MG/DL (ref 15–325)
CREAT UR-MCNC: 101.4 MG/DL (ref 15–325)
DIFFERENTIAL METHOD BLD: ABNORMAL
EOSINOPHIL # BLD AUTO: 0 K/UL (ref 0–0.5)
EOSINOPHIL NFR BLD: 0.2 % (ref 0–8)
ERYTHROCYTE [DISTWIDTH] IN BLOOD BY AUTOMATED COUNT: 18.4 % (ref 11.5–14.5)
GLUCOSE UR QL STRIP: NEGATIVE
HCT VFR BLD AUTO: 39 % (ref 37–48.5)
HGB BLD-MCNC: 12.7 G/DL (ref 12–16)
HGB UR QL STRIP: ABNORMAL
HYALINE CASTS #/AREA URNS LPF: 3 /LPF
IMM GRANULOCYTES # BLD AUTO: 0.03 K/UL (ref 0–0.04)
IMM GRANULOCYTES NFR BLD AUTO: 0.5 % (ref 0–0.5)
KETONES UR QL STRIP: NEGATIVE
LEUKOCYTE ESTERASE UR QL STRIP: NEGATIVE
LYMPHOCYTES # BLD AUTO: 1.5 K/UL (ref 1–4.8)
LYMPHOCYTES NFR BLD: 25.1 % (ref 18–48)
MCH RBC QN AUTO: 29.1 PG (ref 27–31)
MCHC RBC AUTO-ENTMCNC: 32.6 G/DL (ref 32–36)
MCV RBC AUTO: 89 FL (ref 82–98)
MICROSCOPIC COMMENT: ABNORMAL
MONOCYTES # BLD AUTO: 0.7 K/UL (ref 0.3–1)
MONOCYTES NFR BLD: 11.9 % (ref 4–15)
NEUTROPHILS # BLD AUTO: 3.6 K/UL (ref 1.8–7.7)
NEUTROPHILS NFR BLD: 62.1 % (ref 38–73)
NITRITE UR QL STRIP: NEGATIVE
NRBC BLD-RTO: 0 /100 WBC
OPIATES UR QL SCN: ABNORMAL
PCP UR QL SCN>25 NG/ML: NEGATIVE
PH UR STRIP: 7 [PH] (ref 5–8)
PLATELET # BLD AUTO: 108 K/UL (ref 150–450)
PMV BLD AUTO: 10.3 FL (ref 9.2–12.9)
PROT UR QL STRIP: ABNORMAL
RBC # BLD AUTO: 4.37 M/UL (ref 4–5.4)
RBC #/AREA URNS HPF: 14 /HPF (ref 0–4)
SP GR UR STRIP: >1.03 (ref 1–1.03)
SQUAMOUS #/AREA URNS HPF: 2 /HPF
TOXICOLOGY INFORMATION: ABNORMAL
URN SPEC COLLECT METH UR: ABNORMAL
UROBILINOGEN UR STRIP-ACNC: NEGATIVE EU/DL
WBC # BLD AUTO: 5.78 K/UL (ref 3.9–12.7)
WBC #/AREA URNS HPF: 1 /HPF (ref 0–5)

## 2023-12-31 PROCEDURE — 25000003 PHARM REV CODE 250: Performed by: OBSTETRICS & GYNECOLOGY

## 2023-12-31 PROCEDURE — 81001 URINALYSIS AUTO W/SCOPE: CPT | Performed by: OBSTETRICS & GYNECOLOGY

## 2023-12-31 PROCEDURE — 94761 N-INVAS EAR/PLS OXIMETRY MLT: CPT

## 2023-12-31 PROCEDURE — 99900031 HC PATIENT EDUCATION (STAT)

## 2023-12-31 PROCEDURE — 80348 DRUG SCREENING BUPRENORPHINE: CPT | Performed by: OBSTETRICS & GYNECOLOGY

## 2023-12-31 PROCEDURE — 25000003 PHARM REV CODE 250: Performed by: NURSE ANESTHETIST, CERTIFIED REGISTERED

## 2023-12-31 PROCEDURE — 86850 RBC ANTIBODY SCREEN: CPT | Performed by: OBSTETRICS & GYNECOLOGY

## 2023-12-31 PROCEDURE — 37000008 HC ANESTHESIA 1ST 15 MINUTES: Performed by: OBSTETRICS & GYNECOLOGY

## 2023-12-31 PROCEDURE — 27201423 OPTIME MED/SURG SUP & DEVICES STERILE SUPPLY: Performed by: OBSTETRICS & GYNECOLOGY

## 2023-12-31 PROCEDURE — 59514 PRA REAN DELIVERY ONLY: ICD-10-PCS | Mod: ,,, | Performed by: ANESTHESIOLOGY

## 2023-12-31 PROCEDURE — 72200004 HC VAGINAL DELIVERY LEVEL I

## 2023-12-31 PROCEDURE — 85025 COMPLETE CBC W/AUTO DIFF WBC: CPT | Performed by: OBSTETRICS & GYNECOLOGY

## 2023-12-31 PROCEDURE — 63600175 PHARM REV CODE 636 W HCPCS: Performed by: NURSE ANESTHETIST, CERTIFIED REGISTERED

## 2023-12-31 PROCEDURE — 58605 DIVISION OF FALLOPIAN TUBE: CPT | Mod: SZN

## 2023-12-31 PROCEDURE — 12000002 HC ACUTE/MED SURGE SEMI-PRIVATE ROOM

## 2023-12-31 PROCEDURE — 0UB70ZZ EXCISION OF BILATERAL FALLOPIAN TUBES, OPEN APPROACH: ICD-10-PCS | Performed by: OBSTETRICS & GYNECOLOGY

## 2023-12-31 PROCEDURE — 36000680 HC C/S TUBAL LIGATION LEVEL I

## 2023-12-31 PROCEDURE — 37000009 HC ANESTHESIA EA ADD 15 MINS: Performed by: OBSTETRICS & GYNECOLOGY

## 2023-12-31 PROCEDURE — 99900035 HC TECH TIME PER 15 MIN (STAT)

## 2023-12-31 PROCEDURE — 36000766 HC CESAREAN/HYSTERECT  LEVEL I

## 2023-12-31 PROCEDURE — 86592 SYPHILIS TEST NON-TREP QUAL: CPT | Performed by: OBSTETRICS & GYNECOLOGY

## 2023-12-31 PROCEDURE — 80307 DRUG TEST PRSMV CHEM ANLYZR: CPT | Performed by: OBSTETRICS & GYNECOLOGY

## 2023-12-31 PROCEDURE — 59514 CESAREAN DELIVERY ONLY: CPT | Mod: ,,, | Performed by: ANESTHESIOLOGY

## 2023-12-31 PROCEDURE — 63600175 PHARM REV CODE 636 W HCPCS: Performed by: OBSTETRICS & GYNECOLOGY

## 2023-12-31 PROCEDURE — 63600175 PHARM REV CODE 636 W HCPCS: Performed by: ANESTHESIOLOGY

## 2023-12-31 PROCEDURE — C9290 INJ, BUPIVACAINE LIPOSOME: HCPCS | Performed by: OBSTETRICS & GYNECOLOGY

## 2023-12-31 PROCEDURE — 71000039 HC RECOVERY, EACH ADD'L HOUR: Performed by: OBSTETRICS & GYNECOLOGY

## 2023-12-31 PROCEDURE — 71000033 HC RECOVERY, INTIAL HOUR: Performed by: OBSTETRICS & GYNECOLOGY

## 2023-12-31 RX ORDER — HYDROMORPHONE HCL IN 0.9% NACL 6 MG/30 ML
PATIENT CONTROLLED ANALGESIA SYRINGE INTRAVENOUS CONTINUOUS
Status: DISCONTINUED | OUTPATIENT
Start: 2023-12-31 | End: 2024-01-01

## 2023-12-31 RX ORDER — DEXAMETHASONE SODIUM PHOSPHATE 4 MG/ML
4 INJECTION, SOLUTION INTRA-ARTICULAR; INTRALESIONAL; INTRAMUSCULAR; INTRAVENOUS; SOFT TISSUE EVERY 12 HOURS PRN
Status: DISCONTINUED | OUTPATIENT
Start: 2023-12-31 | End: 2024-01-04 | Stop reason: HOSPADM

## 2023-12-31 RX ORDER — SUCCINYLCHOLINE CHLORIDE 20 MG/ML
INJECTION INTRAMUSCULAR; INTRAVENOUS
Status: DISCONTINUED | OUTPATIENT
Start: 2023-12-31 | End: 2023-12-31

## 2023-12-31 RX ORDER — DEXAMETHASONE SODIUM PHOSPHATE 4 MG/ML
INJECTION, SOLUTION INTRA-ARTICULAR; INTRALESIONAL; INTRAMUSCULAR; INTRAVENOUS; SOFT TISSUE
Status: DISCONTINUED | OUTPATIENT
Start: 2023-12-31 | End: 2023-12-31

## 2023-12-31 RX ORDER — LABETALOL 200 MG/1
200 TABLET, FILM COATED ORAL EVERY 12 HOURS
Status: DISCONTINUED | OUTPATIENT
Start: 2023-12-31 | End: 2023-12-31

## 2023-12-31 RX ORDER — NIFEDIPINE 30 MG/1
60 TABLET, EXTENDED RELEASE ORAL 2 TIMES DAILY
Status: DISCONTINUED | OUTPATIENT
Start: 2023-12-31 | End: 2023-12-31

## 2023-12-31 RX ORDER — METHYLERGONOVINE MALEATE 0.2 MG/ML
200 INJECTION INTRAVENOUS ONCE AS NEEDED
Status: DISCONTINUED | OUTPATIENT
Start: 2023-12-31 | End: 2023-12-31

## 2023-12-31 RX ORDER — PROCHLORPERAZINE EDISYLATE 5 MG/ML
5 INJECTION INTRAMUSCULAR; INTRAVENOUS EVERY 6 HOURS PRN
Status: DISCONTINUED | OUTPATIENT
Start: 2023-12-31 | End: 2023-12-31

## 2023-12-31 RX ORDER — MISOPROSTOL 200 UG/1
800 TABLET ORAL ONCE AS NEEDED
Status: DISCONTINUED | OUTPATIENT
Start: 2023-12-31 | End: 2024-01-04 | Stop reason: HOSPADM

## 2023-12-31 RX ORDER — ONDANSETRON 2 MG/ML
4 INJECTION INTRAMUSCULAR; INTRAVENOUS EVERY 6 HOURS PRN
Status: DISCONTINUED | OUTPATIENT
Start: 2023-12-31 | End: 2023-12-31

## 2023-12-31 RX ORDER — DIPHENHYDRAMINE HYDROCHLORIDE 50 MG/ML
12.5 INJECTION, SOLUTION INTRAMUSCULAR; INTRAVENOUS EVERY 4 HOURS PRN
Status: DISCONTINUED | OUTPATIENT
Start: 2023-12-31 | End: 2024-01-04 | Stop reason: HOSPADM

## 2023-12-31 RX ORDER — HYDRALAZINE HYDROCHLORIDE 20 MG/ML
5 INJECTION INTRAMUSCULAR; INTRAVENOUS ONCE AS NEEDED
Status: COMPLETED | OUTPATIENT
Start: 2023-12-31 | End: 2023-12-31

## 2023-12-31 RX ORDER — NALBUPHINE HYDROCHLORIDE 10 MG/ML
5 INJECTION, SOLUTION INTRAMUSCULAR; INTRAVENOUS; SUBCUTANEOUS
Status: DISCONTINUED | OUTPATIENT
Start: 2023-12-31 | End: 2024-01-04 | Stop reason: HOSPADM

## 2023-12-31 RX ORDER — ACETAMINOPHEN 10 MG/ML
INJECTION, SOLUTION INTRAVENOUS
Status: DISCONTINUED | OUTPATIENT
Start: 2023-12-31 | End: 2023-12-31

## 2023-12-31 RX ORDER — LIDOCAINE HYDROCHLORIDE 10 MG/ML
10 INJECTION INFILTRATION; PERINEURAL ONCE AS NEEDED
Status: DISCONTINUED | OUTPATIENT
Start: 2023-12-31 | End: 2023-12-31

## 2023-12-31 RX ORDER — ACETAMINOPHEN 10 MG/ML
1000 INJECTION, SOLUTION INTRAVENOUS ONCE
Status: DISCONTINUED | OUTPATIENT
Start: 2023-12-31 | End: 2024-01-01

## 2023-12-31 RX ORDER — MEPERIDINE HYDROCHLORIDE 25 MG/ML
12.5 INJECTION INTRAMUSCULAR; INTRAVENOUS; SUBCUTANEOUS ONCE AS NEEDED
Status: ACTIVE | OUTPATIENT
Start: 2023-12-31 | End: 2024-01-01

## 2023-12-31 RX ORDER — HYDRALAZINE HYDROCHLORIDE 20 MG/ML
10 INJECTION INTRAMUSCULAR; INTRAVENOUS ONCE AS NEEDED
Status: COMPLETED | OUTPATIENT
Start: 2023-12-31 | End: 2023-12-31

## 2023-12-31 RX ORDER — NIFEDIPINE 30 MG/1
90 TABLET, EXTENDED RELEASE ORAL 2 TIMES DAILY
Status: DISCONTINUED | OUTPATIENT
Start: 2024-01-01 | End: 2024-01-01

## 2023-12-31 RX ORDER — CARBOPROST TROMETHAMINE 250 UG/ML
250 INJECTION, SOLUTION INTRAMUSCULAR
Status: DISCONTINUED | OUTPATIENT
Start: 2023-12-31 | End: 2024-01-04 | Stop reason: HOSPADM

## 2023-12-31 RX ORDER — LABETALOL HYDROCHLORIDE 5 MG/ML
80 INJECTION, SOLUTION INTRAVENOUS ONCE AS NEEDED
Status: DISCONTINUED | OUTPATIENT
Start: 2023-12-31 | End: 2024-01-04 | Stop reason: HOSPADM

## 2023-12-31 RX ORDER — BUPIVACAINE HYDROCHLORIDE 5 MG/ML
INJECTION, SOLUTION EPIDURAL; INTRACAUDAL CODE/TRAUMA/SEDATION MEDICATION
Status: DISCONTINUED | OUTPATIENT
Start: 2023-12-31 | End: 2023-12-31 | Stop reason: HOSPADM

## 2023-12-31 RX ORDER — PROPOFOL 10 MG/ML
VIAL (ML) INTRAVENOUS
Status: DISCONTINUED | OUTPATIENT
Start: 2023-12-31 | End: 2023-12-31

## 2023-12-31 RX ORDER — OXYTOCIN/RINGER'S LACTATE 30/500 ML
334 PLASTIC BAG, INJECTION (ML) INTRAVENOUS ONCE AS NEEDED
Status: DISCONTINUED | OUTPATIENT
Start: 2023-12-31 | End: 2024-01-04 | Stop reason: HOSPADM

## 2023-12-31 RX ORDER — ONDANSETRON HYDROCHLORIDE 2 MG/ML
INJECTION, SOLUTION INTRAMUSCULAR; INTRAVENOUS
Status: DISCONTINUED | OUTPATIENT
Start: 2023-12-31 | End: 2023-12-31

## 2023-12-31 RX ORDER — ACETAMINOPHEN 500 MG
1000 TABLET ORAL EVERY 6 HOURS PRN
Status: DISCONTINUED | OUTPATIENT
Start: 2023-12-31 | End: 2024-01-04 | Stop reason: HOSPADM

## 2023-12-31 RX ORDER — FENTANYL CITRATE 50 UG/ML
INJECTION, SOLUTION INTRAMUSCULAR; INTRAVENOUS
Status: DISCONTINUED | OUTPATIENT
Start: 2023-12-31 | End: 2023-12-31

## 2023-12-31 RX ORDER — LABETALOL HYDROCHLORIDE 5 MG/ML
20 INJECTION, SOLUTION INTRAVENOUS ONCE AS NEEDED
Status: COMPLETED | OUTPATIENT
Start: 2023-12-31 | End: 2023-12-31

## 2023-12-31 RX ORDER — OXYTOCIN 10 [USP'U]/ML
10 INJECTION, SOLUTION INTRAMUSCULAR; INTRAVENOUS ONCE AS NEEDED
Status: DISCONTINUED | OUTPATIENT
Start: 2023-12-31 | End: 2023-12-31

## 2023-12-31 RX ORDER — HYDROMORPHONE HYDROCHLORIDE 1 MG/ML
2 INJECTION, SOLUTION INTRAMUSCULAR; INTRAVENOUS; SUBCUTANEOUS ONCE
Status: COMPLETED | OUTPATIENT
Start: 2023-12-31 | End: 2023-12-31

## 2023-12-31 RX ORDER — NIFEDIPINE 10 MG/1
10 CAPSULE ORAL ONCE AS NEEDED
Status: COMPLETED | OUTPATIENT
Start: 2023-12-31 | End: 2023-12-31

## 2023-12-31 RX ORDER — DIPHENOXYLATE HYDROCHLORIDE AND ATROPINE SULFATE 2.5; .025 MG/1; MG/1
2 TABLET ORAL EVERY 6 HOURS PRN
Status: DISCONTINUED | OUTPATIENT
Start: 2023-12-31 | End: 2024-01-04 | Stop reason: HOSPADM

## 2023-12-31 RX ORDER — CALCIUM CARBONATE 200(500)MG
500 TABLET,CHEWABLE ORAL 3 TIMES DAILY PRN
Status: DISCONTINUED | OUTPATIENT
Start: 2023-12-31 | End: 2024-01-04 | Stop reason: HOSPADM

## 2023-12-31 RX ORDER — OXYCODONE HYDROCHLORIDE 5 MG/1
10 TABLET ORAL EVERY 6 HOURS PRN
Status: DISCONTINUED | OUTPATIENT
Start: 2023-12-31 | End: 2024-01-01

## 2023-12-31 RX ORDER — OXYCODONE HYDROCHLORIDE 5 MG/1
5 TABLET ORAL EVERY 4 HOURS PRN
Status: DISCONTINUED | OUTPATIENT
Start: 2023-12-31 | End: 2024-01-01

## 2023-12-31 RX ORDER — OXYTOCIN/RINGER'S LACTATE 30/500 ML
334 PLASTIC BAG, INJECTION (ML) INTRAVENOUS ONCE
Status: DISCONTINUED | OUTPATIENT
Start: 2023-12-31 | End: 2024-01-04 | Stop reason: HOSPADM

## 2023-12-31 RX ORDER — OXYTOCIN/RINGER'S LACTATE 30/500 ML
95 PLASTIC BAG, INJECTION (ML) INTRAVENOUS ONCE AS NEEDED
Status: DISCONTINUED | OUTPATIENT
Start: 2023-12-31 | End: 2024-01-04 | Stop reason: HOSPADM

## 2023-12-31 RX ORDER — FAMOTIDINE 10 MG/ML
20 INJECTION INTRAVENOUS 2 TIMES DAILY PRN
Status: DISCONTINUED | OUTPATIENT
Start: 2023-12-31 | End: 2024-01-04 | Stop reason: HOSPADM

## 2023-12-31 RX ORDER — LABETALOL 200 MG/1
200 TABLET, FILM COATED ORAL NIGHTLY
Status: DISCONTINUED | OUTPATIENT
Start: 2023-12-31 | End: 2024-01-01

## 2023-12-31 RX ORDER — NIFEDIPINE 10 MG/1
20 CAPSULE ORAL ONCE AS NEEDED
Status: COMPLETED | OUTPATIENT
Start: 2023-12-31 | End: 2024-01-01

## 2023-12-31 RX ORDER — LABETALOL HYDROCHLORIDE 5 MG/ML
40 INJECTION, SOLUTION INTRAVENOUS ONCE AS NEEDED
Status: DISCONTINUED | OUTPATIENT
Start: 2023-12-31 | End: 2024-01-04 | Stop reason: HOSPADM

## 2023-12-31 RX ORDER — CEFAZOLIN 2 G/1
INJECTION, POWDER, FOR SOLUTION INTRAMUSCULAR; INTRAVENOUS
Status: DISCONTINUED | OUTPATIENT
Start: 2023-12-31 | End: 2023-12-31

## 2023-12-31 RX ORDER — NALOXONE HCL 0.4 MG/ML
0.4 VIAL (ML) INJECTION ONCE
Status: DISCONTINUED | OUTPATIENT
Start: 2023-12-31 | End: 2024-01-04 | Stop reason: HOSPADM

## 2023-12-31 RX ORDER — SODIUM CHLORIDE, SODIUM LACTATE, POTASSIUM CHLORIDE, CALCIUM CHLORIDE 600; 310; 30; 20 MG/100ML; MG/100ML; MG/100ML; MG/100ML
INJECTION, SOLUTION INTRAVENOUS CONTINUOUS
Status: DISCONTINUED | OUTPATIENT
Start: 2023-12-31 | End: 2024-01-04

## 2023-12-31 RX ORDER — OXYTOCIN-SODIUM CHLORIDE 0.9% IV SOLN 30 UNIT/500ML 30-0.9/5 UT/ML-%
SOLUTION INTRAVENOUS
Status: DISCONTINUED | OUTPATIENT
Start: 2023-12-31 | End: 2023-12-31

## 2023-12-31 RX ORDER — SODIUM CHLORIDE 9 MG/ML
INJECTION, SOLUTION INTRAVENOUS
Status: DISCONTINUED | OUTPATIENT
Start: 2023-12-31 | End: 2023-12-31

## 2023-12-31 RX ORDER — ONDANSETRON 2 MG/ML
4 INJECTION INTRAMUSCULAR; INTRAVENOUS EVERY 6 HOURS PRN
Status: DISCONTINUED | OUTPATIENT
Start: 2023-12-31 | End: 2024-01-04 | Stop reason: HOSPADM

## 2023-12-31 RX ADMIN — HYDRALAZINE HYDROCHLORIDE 5 MG: 20 INJECTION INTRAMUSCULAR; INTRAVENOUS at 08:12

## 2023-12-31 RX ADMIN — CEFAZOLIN 2 G: 2 INJECTION, POWDER, FOR SOLUTION INTRAMUSCULAR; INTRAVENOUS at 05:12

## 2023-12-31 RX ADMIN — Medication 30 UNITS: at 05:12

## 2023-12-31 RX ADMIN — Medication: at 07:12

## 2023-12-31 RX ADMIN — ACETAMINOPHEN 1000 MG: 500 TABLET ORAL at 10:12

## 2023-12-31 RX ADMIN — Medication 15 UNITS: at 06:12

## 2023-12-31 RX ADMIN — HYDRALAZINE HYDROCHLORIDE 10 MG: 20 INJECTION INTRAMUSCULAR; INTRAVENOUS at 08:12

## 2023-12-31 RX ADMIN — PROPOFOL 150 MG: 10 INJECTION, EMULSION INTRAVENOUS at 05:12

## 2023-12-31 RX ADMIN — ONDANSETRON 4 MG: 2 INJECTION INTRAMUSCULAR; INTRAVENOUS at 05:12

## 2023-12-31 RX ADMIN — Medication 120 MG: at 05:12

## 2023-12-31 RX ADMIN — FENTANYL CITRATE 100 MCG: 0.05 INJECTION, SOLUTION INTRAMUSCULAR; INTRAVENOUS at 05:12

## 2023-12-31 RX ADMIN — DEXAMETHASONE SODIUM PHOSPHATE 8 MG: 4 INJECTION, SOLUTION INTRAMUSCULAR; INTRAVENOUS at 05:12

## 2023-12-31 RX ADMIN — ACETAMINOPHEN 1000 MG: 10 INJECTION, SOLUTION INTRAVENOUS at 05:12

## 2023-12-31 RX ADMIN — SODIUM CHLORIDE: 900 INJECTION INTRAVENOUS at 05:12

## 2023-12-31 RX ADMIN — SODIUM CHLORIDE, SODIUM LACTATE, POTASSIUM CHLORIDE, AND CALCIUM CHLORIDE: .6; .31; .03; .02 INJECTION, SOLUTION INTRAVENOUS at 05:12

## 2023-12-31 RX ADMIN — HYDROMORPHONE HYDROCHLORIDE 2 MG: 1 INJECTION, SOLUTION INTRAMUSCULAR; INTRAVENOUS; SUBCUTANEOUS at 07:12

## 2023-12-31 RX ADMIN — LABETALOL HYDROCHLORIDE 200 MG: 200 TABLET, FILM COATED ORAL at 09:12

## 2023-12-31 RX ADMIN — LABETALOL HYDROCHLORIDE 20 MG: 5 INJECTION, SOLUTION INTRAVENOUS at 07:12

## 2023-12-31 RX ADMIN — NIFEDIPINE 10 MG: 10 CAPSULE ORAL at 09:12

## 2023-12-31 NOTE — ANESTHESIA PREPROCEDURE EVALUATION
12/31/2023  Lorena Green is a 38 y.o., female.    Patient Active Problem List   Diagnosis    Gestational hypertension, third trimester       Past Surgical History:   Procedure Laterality Date    CYSTECTOMY      right breast cyst removal in 1985 as baby        Tobacco Use:  The patient  reports that she has never smoked. She does not have any smokeless tobacco history on file.     No results found for this or any previous visit.          Lab Results   Component Value Date    WBC 5.82 12/26/2023    HGB 12.9 12/26/2023    HCT 40.4 12/26/2023    MCV 90 12/26/2023     (L) 12/26/2023     BMP  Lab Results   Component Value Date     (L) 12/26/2023    K 4.2 12/26/2023     12/26/2023    CO2 24 12/26/2023    BUN 13 12/26/2023    CREATININE 0.6 12/26/2023    CALCIUM 9.0 12/26/2023    ANIONGAP 6 (L) 12/26/2023    GLU 84 12/26/2023     (H) 12/18/2023    GLU 78 12/07/2023       No results found for this or any previous visit.            Pre-op Assessment    I have reviewed the Patient Summary Reports.     I have reviewed the Nursing Notes. I have reviewed the NPO Status.   I have reviewed the Medications.     Review of Systems  Anesthesia Hx:  No problems with previous Anesthesia             Denies Family Hx of Anesthesia complications.    Denies Personal Hx of Anesthesia complications.                    Social:  Non-Smoker       Hematology/Oncology:  Hematology Normal                                     EENT/Dental:  EENT/Dental Normal           Cardiovascular:     Hypertension                                        Pulmonary:  Pulmonary Normal                       Renal/:  Renal/ Normal                 Hepatic/GI:     GERD             Musculoskeletal:  Musculoskeletal Normal                Neurological:  Neurology Normal                                      Endocrine:  Endocrine Normal             Psych:  Psychiatric Normal                    Physical Exam  General: Well nourished    Airway:  Mallampati: IV   Mouth Opening: Normal  TM Distance: Normal  Tongue: Normal  Neck ROM: Normal ROM    Dental:  Intact    Chest/Lungs:  Clear to auscultation, Normal Respiratory Rate    Heart:  Rate: Normal  Rhythm: Regular Rhythm        Anesthesia Plan  Type of Anesthesia, risks & benefits discussed:    Anesthesia Type: Gen ETT  Intra-op Monitoring Plan: Standard ASA Monitors  Post Op Pain Control Plan: IV/PO Opioids PRN and multimodal analgesia  Induction:  IV  Airway Plan: Video  Informed Consent: Informed consent signed with the Patient and all parties understand the risks and agree with anesthesia plan.  All questions answered.   ASA Score: 2 Emergent  Anesthesia Plan Notes: Ofirmev 1000mg  PONV prophylaxis with Decadron 8 mg IV and zofran 4 mg.      Ready For Surgery From Anesthesia Perspective.     .

## 2023-12-31 NOTE — NURSING
Pt arrived to L&D with complaint of contractions & bleeding@ 1700. EFM applied.  1705 Baby A & Baby B 's-150's  1707 Dr. Ac notified that patient was complete. Dr. Ac on her way. Nursery & NI notified of pt arrival and status  1711 precip unsterile controlled vaginal delivery of a viable baby boy by Alie Powers RN  1714 notified ultrasound that we needed stat ultrasound for presentation of Baby B  171 Dr. Ac on the unit. Ultrasound at bedside breech presentation confirmed. Patient prepared to be rolled to the back for   173 radiology called for stat xray in the OR  180- radiology contacted, xray currently being read, results pending  - radiology contacted again, results still pending  - radiology called to give Dr. Ac the xray results

## 2024-01-01 ENCOUNTER — ANESTHESIA EVENT (OUTPATIENT)
Dept: OBSTETRICS AND GYNECOLOGY | Facility: HOSPITAL | Age: 39
End: 2024-01-01

## 2024-01-01 LAB
ALBUMIN SERPL BCP-MCNC: 3.4 G/DL (ref 3.5–5.2)
ALP SERPL-CCNC: 138 U/L (ref 55–135)
ALT SERPL W/O P-5'-P-CCNC: 20 U/L (ref 10–44)
ANION GAP SERPL CALC-SCNC: 8 MMOL/L (ref 8–16)
AST SERPL-CCNC: 60 U/L (ref 10–40)
BASOPHILS # BLD AUTO: 0.01 K/UL (ref 0–0.2)
BASOPHILS NFR BLD: 0.1 % (ref 0–1.9)
BILIRUB SERPL-MCNC: 0.2 MG/DL (ref 0.1–1)
BUN SERPL-MCNC: 7 MG/DL (ref 6–20)
CALCIUM SERPL-MCNC: 9.2 MG/DL (ref 8.7–10.5)
CHLORIDE SERPL-SCNC: 104 MMOL/L (ref 95–110)
CO2 SERPL-SCNC: 23 MMOL/L (ref 23–29)
CREAT SERPL-MCNC: 0.6 MG/DL (ref 0.5–1.4)
DIFFERENTIAL METHOD BLD: ABNORMAL
EOSINOPHIL # BLD AUTO: 0 K/UL (ref 0–0.5)
EOSINOPHIL NFR BLD: 0 % (ref 0–8)
ERYTHROCYTE [DISTWIDTH] IN BLOOD BY AUTOMATED COUNT: 18.5 % (ref 11.5–14.5)
EST. GFR  (NO RACE VARIABLE): >60 ML/MIN/1.73 M^2
GLUCOSE SERPL-MCNC: 111 MG/DL (ref 70–110)
HCT VFR BLD AUTO: 40.2 % (ref 37–48.5)
HGB BLD-MCNC: 13 G/DL (ref 12–16)
IMM GRANULOCYTES # BLD AUTO: 0.09 K/UL (ref 0–0.04)
IMM GRANULOCYTES NFR BLD AUTO: 0.6 % (ref 0–0.5)
LYMPHOCYTES # BLD AUTO: 1.7 K/UL (ref 1–4.8)
LYMPHOCYTES NFR BLD: 12.1 % (ref 18–48)
MCH RBC QN AUTO: 28.8 PG (ref 27–31)
MCHC RBC AUTO-ENTMCNC: 32.3 G/DL (ref 32–36)
MCV RBC AUTO: 89 FL (ref 82–98)
MONOCYTES # BLD AUTO: 0.9 K/UL (ref 0.3–1)
MONOCYTES NFR BLD: 6.4 % (ref 4–15)
NEUTROPHILS # BLD AUTO: 11.5 K/UL (ref 1.8–7.7)
NEUTROPHILS NFR BLD: 80.8 % (ref 38–73)
NRBC BLD-RTO: 0 /100 WBC
PLATELET # BLD AUTO: 125 K/UL (ref 150–450)
PMV BLD AUTO: 10.5 FL (ref 9.2–12.9)
POTASSIUM SERPL-SCNC: 4 MMOL/L (ref 3.5–5.1)
PROT SERPL-MCNC: 6.3 G/DL (ref 6–8.4)
RBC # BLD AUTO: 4.52 M/UL (ref 4–5.4)
SODIUM SERPL-SCNC: 135 MMOL/L (ref 136–145)
WBC # BLD AUTO: 14.27 K/UL (ref 3.9–12.7)

## 2024-01-01 PROCEDURE — 63600175 PHARM REV CODE 636 W HCPCS: Performed by: ANESTHESIOLOGY

## 2024-01-01 PROCEDURE — 80053 COMPREHEN METABOLIC PANEL: CPT | Performed by: GENERAL PRACTICE

## 2024-01-01 PROCEDURE — 63600175 PHARM REV CODE 636 W HCPCS: Performed by: GENERAL PRACTICE

## 2024-01-01 PROCEDURE — 25000003 PHARM REV CODE 250: Performed by: GENERAL PRACTICE

## 2024-01-01 PROCEDURE — 25000003 PHARM REV CODE 250: Performed by: INTERNAL MEDICINE

## 2024-01-01 PROCEDURE — 63600175 PHARM REV CODE 636 W HCPCS: Performed by: OBSTETRICS & GYNECOLOGY

## 2024-01-01 PROCEDURE — 25000003 PHARM REV CODE 250: Performed by: OBSTETRICS & GYNECOLOGY

## 2024-01-01 PROCEDURE — 36415 COLL VENOUS BLD VENIPUNCTURE: CPT | Performed by: GENERAL PRACTICE

## 2024-01-01 PROCEDURE — 12000002 HC ACUTE/MED SURGE SEMI-PRIVATE ROOM

## 2024-01-01 PROCEDURE — 85025 COMPLETE CBC W/AUTO DIFF WBC: CPT | Performed by: GENERAL PRACTICE

## 2024-01-01 RX ORDER — HYDRALAZINE HYDROCHLORIDE 10 MG/1
10 TABLET, FILM COATED ORAL EVERY 8 HOURS
Status: DISCONTINUED | OUTPATIENT
Start: 2024-01-01 | End: 2024-01-01

## 2024-01-01 RX ORDER — PANTOPRAZOLE SODIUM 40 MG/1
40 TABLET, DELAYED RELEASE ORAL DAILY
Status: DISCONTINUED | OUTPATIENT
Start: 2024-01-01 | End: 2024-01-04 | Stop reason: HOSPADM

## 2024-01-01 RX ORDER — OXYCODONE AND ACETAMINOPHEN 5; 325 MG/1; MG/1
1 TABLET ORAL EVERY 4 HOURS PRN
Status: DISCONTINUED | OUTPATIENT
Start: 2024-01-01 | End: 2024-01-04 | Stop reason: HOSPADM

## 2024-01-01 RX ORDER — OXYCODONE AND ACETAMINOPHEN 5; 325 MG/1; MG/1
2 TABLET ORAL EVERY 4 HOURS PRN
Status: DISCONTINUED | OUTPATIENT
Start: 2024-01-01 | End: 2024-01-04 | Stop reason: HOSPADM

## 2024-01-01 RX ORDER — NIFEDIPINE 30 MG/1
90 TABLET, EXTENDED RELEASE ORAL 3 TIMES DAILY
Status: DISCONTINUED | OUTPATIENT
Start: 2024-01-01 | End: 2024-01-01

## 2024-01-01 RX ORDER — MAGNESIUM SULFATE HEPTAHYDRATE 40 MG/ML
4 INJECTION, SOLUTION INTRAVENOUS ONCE
Status: COMPLETED | OUTPATIENT
Start: 2024-01-01 | End: 2024-01-01

## 2024-01-01 RX ORDER — SODIUM CHLORIDE, SODIUM LACTATE, POTASSIUM CHLORIDE, CALCIUM CHLORIDE 600; 310; 30; 20 MG/100ML; MG/100ML; MG/100ML; MG/100ML
INJECTION, SOLUTION INTRAVENOUS CONTINUOUS
Status: DISCONTINUED | OUTPATIENT
Start: 2024-01-01 | End: 2024-01-04

## 2024-01-01 RX ORDER — NIFEDIPINE 30 MG/1
90 TABLET, EXTENDED RELEASE ORAL 2 TIMES DAILY
Status: DISCONTINUED | OUTPATIENT
Start: 2024-01-01 | End: 2024-01-01

## 2024-01-01 RX ORDER — KETOROLAC TROMETHAMINE 30 MG/ML
30 INJECTION, SOLUTION INTRAMUSCULAR; INTRAVENOUS EVERY 6 HOURS
Status: COMPLETED | OUTPATIENT
Start: 2024-01-01 | End: 2024-01-02

## 2024-01-01 RX ORDER — CALCIUM GLUCONATE 98 MG/ML
1 INJECTION, SOLUTION INTRAVENOUS
Status: DISCONTINUED | OUTPATIENT
Start: 2024-01-01 | End: 2024-01-02

## 2024-01-01 RX ORDER — MAGNESIUM SULFATE HEPTAHYDRATE 40 MG/ML
2 INJECTION, SOLUTION INTRAVENOUS CONTINUOUS
Status: DISCONTINUED | OUTPATIENT
Start: 2024-01-01 | End: 2024-01-02 | Stop reason: ALTCHOICE

## 2024-01-01 RX ADMIN — CEFOXITIN 2 G: 2 INJECTION, POWDER, FOR SOLUTION INTRAVENOUS at 01:01

## 2024-01-01 RX ADMIN — OXYCODONE HYDROCHLORIDE AND ACETAMINOPHEN 1 TABLET: 5; 325 TABLET ORAL at 05:01

## 2024-01-01 RX ADMIN — LABETALOL HYDROCHLORIDE 300 MG: 200 TABLET, FILM COATED ORAL at 08:01

## 2024-01-01 RX ADMIN — SODIUM CHLORIDE, SODIUM LACTATE, POTASSIUM CHLORIDE, AND CALCIUM CHLORIDE: .6; .31; .03; .02 INJECTION, SOLUTION INTRAVENOUS at 10:01

## 2024-01-01 RX ADMIN — NIFEDIPINE 90 MG: 30 TABLET, FILM COATED, EXTENDED RELEASE ORAL at 01:01

## 2024-01-01 RX ADMIN — MAGNESIUM SULFATE HEPTAHYDRATE 4 G: 40 INJECTION, SOLUTION INTRAVENOUS at 10:01

## 2024-01-01 RX ADMIN — HYDRALAZINE HYDROCHLORIDE 10 MG: 10 TABLET, FILM COATED ORAL at 06:01

## 2024-01-01 RX ADMIN — NALBUPHINE HYDROCHLORIDE 5 MG: 10 INJECTION, SOLUTION INTRAMUSCULAR; INTRAVENOUS; SUBCUTANEOUS at 07:01

## 2024-01-01 RX ADMIN — KETOROLAC TROMETHAMINE 30 MG: 30 INJECTION, SOLUTION INTRAMUSCULAR; INTRAVENOUS at 11:01

## 2024-01-01 RX ADMIN — CEFOXITIN 2 G: 2 INJECTION, POWDER, FOR SOLUTION INTRAVENOUS at 07:01

## 2024-01-01 RX ADMIN — HYDRALAZINE HYDROCHLORIDE 10 MG: 10 TABLET, FILM COATED ORAL at 01:01

## 2024-01-01 RX ADMIN — NIFEDIPINE 20 MG: 10 CAPSULE ORAL at 12:01

## 2024-01-01 RX ADMIN — KETOROLAC TROMETHAMINE 30 MG: 30 INJECTION, SOLUTION INTRAMUSCULAR; INTRAVENOUS at 05:01

## 2024-01-01 RX ADMIN — ACETAMINOPHEN 1000 MG: 500 TABLET ORAL at 07:01

## 2024-01-01 RX ADMIN — PANTOPRAZOLE SODIUM 40 MG: 40 TABLET, DELAYED RELEASE ORAL at 09:01

## 2024-01-01 NOTE — NURSING
Nurses Note -- 4 Eyes      12/31/2023   6:06 PM      Skin assessed during: Admit      [] No Altered Skin Integrity Present    []Prevention Measures Documented      [] Yes- Altered Skin Integrity Present or Discovered   [] LDA Added if Not in Epic (Describe Wound)   [] New Altered Skin Integrity was Present on Admit and Documented in LDA   [] Wound Image Taken    Wound Care Consulted? No    Attending Nurse:   MARITZA Powers RN    Second RN/Staff Member:  MOHIT Duvall RNC

## 2024-01-01 NOTE — NURSING
Nurses Note -- 4 Eyes      12/31/2023   6:22 PM      Skin assessed during: Admit      [x] No Altered Skin Integrity Present    [x]Prevention Measures Documented      [] Yes- Altered Skin Integrity Present or Discovered   [] LDA Added if Not in Epic (Describe Wound)   [] New Altered Skin Integrity was Present on Admit and Documented in LDA   [] Wound Image Taken    Wound Care Consulted? No    Attending Nurse:   VIDHI Womack    Second RN/Staff Member:   VIDHI Strange

## 2024-01-01 NOTE — NURSING
Mother  from her baby. Mother encouraged to provide breastmilk by pumping. Benefits of breastmilk discussed. Breastpump initiated. Mother educated on pump use, cleaning pump parts, labeling containers and storage of breastmilk. Mother to call her nurse with further questions.      Delay in breast pumping due to maternal instability.

## 2024-01-01 NOTE — HPI
38 year old female who was pregnant with twins at ?35 weeks presented earlier today with leaking of clear fluid.  SROM was ruled out and she was discharged home. She returned later today with contractions, bleeding and was noted to be completely dilated at that time. She had precipitous vaginal delivery of Baby A.  Baby B was noted to be breach and thus she underwent a C/S for delivery of Baby B.  Hospital medicine consulted in this post operative period for blood pressure  management. Prior to delivery, she is taking Labetolol 300mg tid and Nifedipine Xl 60mg bid. Blood pressure measures were being maintained under 160/110.  She has missed multiple doses of her medication throughout today's events and blood pressure is exceeding 160/110. Nifedipine has been increased to 90mg.  She received night time dose of Labetalol.  IV Labetalol 20mg was administered but then HR started maintaining in the 50s.  Given blood pressure still not at goal, Hydralazine 5mg IV and then 10mg IV administered without the desired effect. She has also received Nifedipine 10mg and 20mg po. She had a headache earlier this evening but received tylenol and this has relieved her headache. She denies blurry vision, chest pain, SOB. She does have back pain, abdominal pain and is starting to itch from the anesthesia medication. Urine protein to Cr ratio is 0.21 which is roughly just above the upper limit of normal.

## 2024-01-01 NOTE — TRANSFER OF CARE
"Anesthesia Transfer of Care Note    Patient: Lorena Green    Procedure(s) Performed: Procedure(s) (LRB):   SECTION (N/A)    Patient location: Labor and Delivery    Anesthesia Type: general    Transport from OR: Transported from OR on 2-3 L/min O2 by NC with adequate spontaneous ventilation    Post pain: adequate analgesia    Post assessment: no apparent anesthetic complications and tolerated procedure well    Post vital signs: stable    Level of consciousness: awake and sedated    Nausea/Vomiting: no nausea/vomiting    Complications: none    Transfer of care protocol was followedComments: Report to VIDHI Cerrato. [facility]. VSS. 166/99, 16 respirations, 97.3 temp, 77 % O2 saturation.      Last vitals: Visit Vitals  BP (!) 163/102   Pulse 79   Resp 20   Ht 5' 2" (1.575 m)   Wt 68.5 kg (151 lb)   SpO2 100%   Breastfeeding No   BMI 27.62 kg/m²     "

## 2024-01-01 NOTE — RESPIRATORY THERAPY
12/31/23 1921   Patient Assessment/Suction   Level of Consciousness (AVPU) alert   Respiratory Effort Unlabored   Rhythm/Pattern, Respiratory unlabored;pattern regular;depth regular   PRE-TX-O2   Device (Oxygen Therapy) room air   SpO2 99 %   Pulse Oximetry Type Continuous   $ Pulse Oximetry - Multiple Charge Pulse Oximetry - Multiple   Pulse 62   Resp 17   ETCO2   $ ETCO2 Usage Currently wearing;At bedside   ETCO2 (mmHg) 35 mmHg   ETCO2 Device Type Bedside Monitor   Education   $ Education 15 min;Other (see comment)   Respiratory Evaluation   $ Care Plan Tech Time 15 min

## 2024-01-01 NOTE — L&D DELIVERY NOTE
[facility]   Section   Operative Note    SUMMARY     Date of Procedure: 2023     Procedure: Procedure(s) (LRB):   SECTION (N/A)    Surgeon(s) and Role:     * Vinita Ac MD - Primary    Assisting Surgeon: None    Pre-Operative Diagnosis: twin, 2nd baby breech, desires sterilization    Post-Operative Diagnosis: same    Anesthesia: General    [facility]ical [facility] [place] [facility]           Description of the Findings of the Procedure: normal female anatomy    Significant Surgical Tasks Conducted by the Assistant(s), if Applicable: none    Complications:none    Blood Loss: * QBL 451cc    I was called at home when the patient arrived completely dilated.  As soon as they hung up the phone with me, the first twin delivered precipitously in the bed, male infant, with Apgars of 9/9. US came up and confirmed the 2nd baby ws breech, so she was brought to the OR with anesthesia present.       The risks, benefits, indication, and alternatives of the procedure were quickly discussed with the patient and informed consent had already been obtained earlier in the day.  She was taken to the operating room where FHT's were documented, in the low 100's.  Time out was taken.  Betadine splash prep was performed, SCD hose were placed, she was draped in a sterile fashion, suction and bovie were turned on, anesthesia intubated her after using general anesthesia, and she received Ancef 2gm IV as antibiotic prophylaxis as the incision was being made.     A Pfannenstiel skin incision was made with the knife, and this was carried down to the fascia. The fascial incision was excised transversely, and then both superiorly and inferiorly off of the muscle. The muscle was  in the midline and the peritoneum was identified and entered bluntly. The peritoneal incision was extended superiorly and inferiorly with good visualization of bladder. The bladder blade was placed and the bladder flap  was created under both sharp and blunt dissection.       The uterine incision was started with the knife, and this incision was extended laterally and bluntly also for a low transverse uterine incision. The fetus was in the jero breech presentation.  With fundal pressure the baby easily delivered to the arms.  Both arms were brought around the fetal chest, then the head was difficult to deliver.  With additional fundal pressure, the fetal head was rotated and delivered in about 45 sec.  It is a viable female infant.  The cord was clamped and cut, and the infant was handed off to the  nurse.  Apgars of 7/9. The uterus was massaged and the placenta was completely removed, then the uterus was exteriorized and wiped clean.The uterine incision was repaired with a single layer of 0 Maxon in a running fashion. The posterior aspect of the uterus was irrigated.  Both tubes and ovaries are inspected and appear normal.    Both fallopian tubes were identified and followed out to the the fimbriated portion for identification.  The proximal segment of the each tube was tied off with 2.0 silk, then a bubble portion of each tube was tied with 0 plain gut x 2 and the intervening segment was cut and sent to pathology.  The left tube had distal hydatid cysts, and those were incorporated into the specimen.  Each tube was hemostatic.    The uterus was returned into the abdomen, and the pelvis was irrigated copiously with warm normal saline. The uterine incision and lower uterine segment were again checked for hemostasis, and Wood was placed over the area for added hemostasis.  All instruments and lap were removed from the abdomen and pelvis.    The peritoneum was closed with a 2-0 Vicryl in a running fashion. The rectus muscles are hemostatic, and the pyramidalis muscle was closed with a U stitch of 2-0 Maxon.  The fascia was closed with 0 Maxon in a running fashion. The subcuticular fat was irrigated with normal saline and  hemostasis was achieved using the Bovie. Adrian's fascia was reapproximated using 2-0 Vicryl in a running fashion. Exparel was injected across the surgical incision.  The skin was closed using 4-0 Monocryl in a subcuticular fashion, Dermaflex was placed over the incision, then a Mepilex dressing was placed over the incision. KUB was taken since no surgical counts were done.  Radiology read the x-ray as no foreign body.  Ruiz was placed after the C/S was completed and drained clear urine.   The patient tolerated procedure well.  She was taken to the recovery in stable condition.  Mefoxin shall be continued for 24hs.      Specimens:   Specimen (24h ago, onward)      None            Condition: Good    Disposition: PACU - hemodynamically stable.    Attestation: Efrain Green, A Geoffrey Carrillo [[id number]]   Delivery Information for A Geoffrey Green    Birth information:  YOB: 2023   Time of birth: 5:11 PM   Sex: male   Head Delivery Date/Time: 2023  5:11 PM   Delivery type: Vaginal, Spontaneous   Gestational Age: 35w6d        Delivery Providers    Delivering clinician:    Provider Role    Oriana Duvall, RN Registered Nurse    Karly Ruiz, HARJINDER Nurse Practitioner    Alie Powers RN Registered Nurse              Measurements    Weight:   Length:          Apgars    Living status:   Apgar Component Scores:  1 min.:  5 min.:  10 min.:  15 min.:  20 min.:    Skin color:         Heart rate:         Reflex irritability:         Muscle tone:         Respiratory effort:         Total:                  Operative Delivery    Forceps attempted?: No  Vacuum extractor attempted?: No         Shoulder Dystocia    Shoulder dystocia present?: No           Presentation    Presentation: Vertex  Position: Occiput Anterior           Interventions/Resuscitation    Method: Tactile Stimulation       Cord    Vessels: 3 vessels  Complications: None  Delayed Cord Clamping?: Yes  Cord Clamped Date/Time: 2023   5:13 PM  Gases Sent?: No  Stem Cell Collection (by MD): No       Placenta    Placenta delivery date/time: 2023 1815  Placenta removal: Manual removal  Placenta appearance: Intact  Placenta disposition: Pathology           Labor Events:       labor: Yes     Labor Onset Date/Time: 2023 16:45     Dilation Complete Date/Time: 2023 17:06     Start Pushing Date/Time: 2023 17:11       Start Pushing Date/Time: 2023 17:11     Rupture Date/Time:            Rupture type:          Fluid Amount:       Fluid Color:                steroids: Full Course     Antibiotics given for GBS: No     Induction:       Indications for induction:        Augmentation:       Indications for augmentation:       Labor complications: None     Additional complications: Precipitous Delivery        Cervical ripening:                     Delivery:      Episiotomy: None     Indication for Episiotomy:       Perineal Lacerations: None Repaired:      Periurethral Laceration:   Repaired:     Labial Laceration:   Repaired:     Sulcus Laceration:   Repaired:     Vaginal Laceration:   Repaired:     Cervical Laceration:   Repaired:     Repair suture:       Repair # of packets: 0     Last Value - EBL - Nursing (mL):       Sum - EBL - Nursing (mL): 0     Last Value - EBL - Anesthesia (mL):      Calculated QBL (mL): 451 mL      Vaginal Sweep Performed:       Surgicount Correct:         Other providers:       Anesthesia    Method: None          Details (if applicable):  Trial of Labor      Categorization:      Priority:     Indications for :     Incision Type:       Additional  information:  Forceps:    Vacuum:    Breech:    Observed anomalies    Other (Comments):            SHILPA Green Brayan Carrillo [[id number]]   Delivery Information for SHILPA Green    Birth information:  YOB: 2023   Time of birth: 5:28 PM   Sex: female   Head Delivery Date/Time: 2023  5:28  PM   Delivery type: , Low Transverse   Gestational Age: 35w6d        Delivery Providers    Delivering clinician: Vinita Ac MD   Provider Role    Alie Powers, RN Registered Nurse    Blossom Ramirez RN Registered Nurse    Karly Ruiz NNP Nurse Practitioner    Halie Atkins RN Registered Nurse              Measurements    Weight:   Length:          Apgars    Living status:   Apgar Component Scores:  1 min.:  5 min.:  10 min.:  15 min.:  20 min.:    Skin color:         Heart rate:         Reflex irritability:         Muscle tone:         Respiratory effort:         Total:                  Operative Delivery    Forceps attempted?: No  Vacuum extractor attempted?: No         Shoulder Dystocia    Shoulder dystocia present?: No           Presentation    Presentation: Man Breech           Interventions/Resuscitation    Method: Bulb Suctioning, Tactile Stimulation, CPAP       Cord    Vessels: 3 vessels  Complications: None  Delayed Cord Clamping?: No  Cord Clamped Date/Time: 2023  5:28 PM  Gases Sent?: No  Cord Comments: unable to obtain cord blood  Stem Cell Collection (by MD): No       Placenta    Placenta delivery date/time: 2023 1729  Placenta removal: Manual removal  Placenta appearance: Intact  Placenta disposition: Pathology           Labor Events:       labor: Yes     Labor Onset Date/Time: 2023 16:45     Dilation Complete Date/Time: 2023 17:01     Start Pushing Date/Time:         Start Pushing Date/Time:       Rupture Date/Time:            Rupture type:          Fluid Amount:       Fluid Color:                steroids: Full Course     Antibiotics given for GBS:       Induction:       Indications for induction:        Augmentation:       Indications for augmentation:       Labor complications: None     Additional complications: Breech Presentation        Cervical ripening:                     Delivery:      Episiotomy: None     Indication for  Episiotomy:       Perineal Lacerations: None Repaired:      Periurethral Laceration:   Repaired:     Labial Laceration:   Repaired:     Sulcus Laceration:   Repaired:     Vaginal Laceration:   Repaired:     Cervical Laceration:   Repaired:     Repair suture:       Repair # of packets: 0     Last Value - EBL - Nursing (mL):       Sum - EBL - Nursing (mL): 0     Last Value - EBL - Anesthesia (mL):      Calculated QBL (mL): 451 mL      Vaginal Sweep Performed:       Surgicount Correct:         Other providers:       Anesthesia    Method: General          Details (if applicable):  Trial of Labor      Categorization: Primary    Priority: Emergency   Indications for : Breech   Incision Type: low transverse     Additional  information:  Forceps:    Vacuum:    Breech:    Observed anomalies    Other (Comments):

## 2024-01-01 NOTE — SUBJECTIVE & OBJECTIVE
Past Medical History:   Diagnosis Date    Anemia     GERD (gastroesophageal reflux disease) 2023    Gestational hypertension        Past Surgical History:   Procedure Laterality Date    CYSTECTOMY      right breast cyst removal in 1985 as baby       Review of patient's allergies indicates:  No Known Allergies    No current facility-administered medications on file prior to encounter.     Current Outpatient Medications on File Prior to Encounter   Medication Sig    aspirin 81 MG Chew Take 81 mg by mouth once daily.    docusate sodium (COLACE) 100 MG capsule Take 100 mg by mouth 2 (two) times daily.    famotidine (PEPCID) 40 MG tablet take 1 tablet by mouth twice daily    ferrous gluconate (FERGON) 324 MG tablet Take 225 mg by mouth daily with breakfast.    labetaloL (NORMODYNE) 200 MG tablet Take 1.5 tablets (300 mg total) by mouth 2 (two) times daily. for 7 days    metroNIDAZOLE (FLAGYL) 500 MG tablet Take 1 tablet (500 mg total) by mouth every 12 (twelve) hours.    multivitamin capsule Take 1 capsule by mouth once daily.    NIFEdipine (PROCARDIA-XL) 30 MG (OSM) 24 hr tablet Take 2 tablets (60 mg total) by mouth 2 (two) times a day.    pantoprazole (PROTONIX) 40 MG tablet Take 40 mg by mouth once daily.    butalbital-acetaminophen-caffeine -40 mg (FIORICET, ESGIC) -40 mg per tablet take 1 tablet by mouth every 4 to 6 hours as needed for headaches    traMADoL (ULTRAM) 50 mg tablet Take 50 mg by mouth.     Family History    None       Tobacco Use    Smoking status: Never    Smokeless tobacco: Not on file   Substance and Sexual Activity    Alcohol use: Not Currently     Comment: rarely    Drug use: Never    Sexual activity: Yes     Partners: Male     Review of Systems   Constitutional: Negative.    HENT: Negative.     Eyes: Negative.    Respiratory: Negative.     Cardiovascular: Negative.    Gastrointestinal:  Positive for abdominal pain.   Endocrine: Negative.    Genitourinary: Negative.   "  Musculoskeletal:  Positive for back pain.   Skin: Negative.    Allergic/Immunologic: Negative.    Neurological: Negative.          Diffuse pruritus    Hematological: Negative.    Psychiatric/Behavioral: Negative.       Objective:     Vital Signs (Most Recent):  Temp: 97.3 °F (36.3 °C) (12/31/23 1854)  Pulse: (!) 57 (01/01/24 0041)  Resp: 18 (01/01/24 0036)  BP: (!) 171/99 (01/01/24 0036)  SpO2: 98 % (01/01/24 0041) Vital Signs (24h Range):  Temp:  [97.3 °F (36.3 °C)-97.5 °F (36.4 °C)] 97.3 °F (36.3 °C)  Pulse:  [] 57  Resp:  [16-20] 18  SpO2:  [90 %-100 %] 98 %  BP: (141-211)/() 171/99     Weight: 68.5 kg (151 lb)  Body mass index is 27.62 kg/m².     Physical Exam  Vitals and nursing note reviewed.   Constitutional:       General: She is not in acute distress.     Appearance: She is well-developed.   HENT:      Head: Normocephalic and atraumatic.   Eyes:      Pupils: Pupils are equal, round, and reactive to light.   Pulmonary:      Effort: Pulmonary effort is normal.   Abdominal:      Palpations: Abdomen is soft.      Comments: Gravid abdomen   Musculoskeletal:         General: Normal range of motion.      Cervical back: Normal range of motion.   Skin:     Findings: No rash.   Neurological:      Mental Status: She is alert and oriented to person, place, and time.      Cranial Nerves: No cranial nerve deficit.      Sensory: No sensory deficit.          Significant Labs: All pertinent labs within the past 24 hours have been reviewed.  CBC:   Recent Labs   Lab 12/31/23  1738   WBC 5.78   HGB 12.7   HCT 39.0   *     CMP: No results for input(s): "NA", "K", "CL", "CO2", "GLU", "BUN", "CREATININE", "CALCIUM", "PROT", "ALBUMIN", "BILITOT", "ALKPHOS", "AST", "ALT", "ANIONGAP", "EGFRNONAA" in the last 48 hours.    Invalid input(s): "ESTGFAFR[place]"  Urine Studies:   Recent Labs   Lab 12/31/23 1940   COLORU Yellow   APPEARANCEUA Clear   PHUR 7.0   SPECGRAV >1.030*   PROTEINUA Trace*   GLUCUA Negative "   KETONESU Negative   BILIRUBINUA Negative   OCCULTUA 2+*   NITRITE Negative   UROBILINOGEN Negative   LEUKOCYTESUR Negative   RBCUA 14*   WBCUA 1   BACTERIA Negative   SQUAMEPITHEL 2   HYALINECASTS 3*       Significant Imaging: I have reviewed all pertinent imaging results/findings within the past 24 hours.

## 2024-01-01 NOTE — CONSULTS
[facility] Medicine  Consult Note    Patient Name: Lorena Green  MRN: [id number]  Admission Date: 2023  Hospital Length of Stay: 1 days  Attending Physician: Ailyn Ji MD   Primary Care Provider: Ani Primary Doctor           Patient information was obtained from patient, past medical records, and ER records.     Consults  Subjective:     Principal Problem:  labor in third trimester    Chief Complaint:   Chief Complaint   Patient presents with    Abdominal Pain    Vaginal Discharge    Vaginal Bleeding        HPI: 38 year old female who was pregnant with twins at ?35 weeks presented earlier today with leaking of clear fluid.  SROM was ruled out and she was discharged home. She returned later today with contractions, bleeding and was noted to be completely dilated at that time. She had precipitous vaginal delivery of Baby A.  Baby B was noted to be breach and thus she underwent a C/S for delivery of Baby B.  Hospital medicine consulted in this post operative period for blood pressure  management. Prior to delivery, she is taking Labetolol 300mg tid and Nifedipine Xl 60mg bid. Blood pressure measures were being maintained under 160/110.  She has missed multiple doses of her medication throughout today's events and blood pressure is exceeding 160/110. Nifedipine has been increased to 90mg.  She received night time dose of Labetalol.  IV Labetalol 20mg was administered but then HR started maintaining in the 50s.  Given blood pressure still not at goal, Hydralazine 5mg IV and then 10mg IV administered without the desired effect. She has also received Nifedipine 10mg and 20mg po. She had a headache earlier this evening but received tylenol and this has relieved her headache. She denies blurry vision, chest pain, SOB. She does have back pain, abdominal pain and is starting to itch from the anesthesia medication. Urine protein to Cr ratio is 0.21 which is roughly just above the  upper limit of normal.     Past Medical History:   Diagnosis Date    Anemia     GERD (gastroesophageal reflux disease) 2023    Gestational hypertension        Past Surgical History:   Procedure Laterality Date    CYSTECTOMY      right breast cyst removal in 1985 as baby       Review of patient's allergies indicates:  No Known Allergies    No current facility-administered medications on file prior to encounter.     Current Outpatient Medications on File Prior to Encounter   Medication Sig    aspirin 81 MG Chew Take 81 mg by mouth once daily.    docusate sodium (COLACE) 100 MG capsule Take 100 mg by mouth 2 (two) times daily.    famotidine (PEPCID) 40 MG tablet take 1 tablet by mouth twice daily    ferrous gluconate (FERGON) 324 MG tablet Take 225 mg by mouth daily with breakfast.    labetaloL (NORMODYNE) 200 MG tablet Take 1.5 tablets (300 mg total) by mouth 2 (two) times daily. for 7 days    metroNIDAZOLE (FLAGYL) 500 MG tablet Take 1 tablet (500 mg total) by mouth every 12 (twelve) hours.    multivitamin capsule Take 1 capsule by mouth once daily.    NIFEdipine (PROCARDIA-XL) 30 MG (OSM) 24 hr tablet Take 2 tablets (60 mg total) by mouth 2 (two) times a day.    pantoprazole (PROTONIX) 40 MG tablet Take 40 mg by mouth once daily.    butalbital-acetaminophen-caffeine -40 mg (FIORICET, ESGIC) -40 mg per tablet take 1 tablet by mouth every 4 to 6 hours as needed for headaches    traMADoL (ULTRAM) 50 mg tablet Take 50 mg by mouth.     Family History    None       Tobacco Use    Smoking status: Never    Smokeless tobacco: Not on file   Substance and Sexual Activity    Alcohol use: Not Currently     Comment: rarely    Drug use: Never    Sexual activity: Yes     Partners: Male     Review of Systems   Constitutional: Negative.    HENT: Negative.     Eyes: Negative.    Respiratory: Negative.     Cardiovascular: Negative.    Gastrointestinal:  Positive for abdominal pain.   Endocrine: Negative.    Genitourinary:  "Negative.    Musculoskeletal:  Positive for back pain.   Skin: Negative.    Allergic/Immunologic: Negative.    Neurological: Negative.          Diffuse pruritus    Hematological: Negative.    Psychiatric/Behavioral: Negative.       Objective:     Vital Signs (Most Recent):  Temp: 97.3 °F (36.3 °C) (12/31/23 1854)  Pulse: (!) 57 (01/01/24 0041)  Resp: 18 (01/01/24 0036)  BP: (!) 171/99 (01/01/24 0036)  SpO2: 98 % (01/01/24 0041) Vital Signs (24h Range):  Temp:  [97.3 °F (36.3 °C)-97.5 °F (36.4 °C)] 97.3 °F (36.3 °C)  Pulse:  [] 57  Resp:  [16-20] 18  SpO2:  [90 %-100 %] 98 %  BP: (141-211)/() 171/99     Weight: 68.5 kg (151 lb)  Body mass index is 27.62 kg/m².     Physical Exam  Vitals and nursing note reviewed.   Constitutional:       General: She is not in acute distress.     Appearance: She is well-developed.   HENT:      Head: Normocephalic and atraumatic.   Eyes:      Pupils: Pupils are equal, round, and reactive to light.   Pulmonary:      Effort: Pulmonary effort is normal.   Abdominal:      Palpations: Abdomen is soft.      Comments: Gravid abdomen   Musculoskeletal:         General: Normal range of motion.      Cervical back: Normal range of motion.   Skin:     Findings: No rash.   Neurological:      Mental Status: She is alert and oriented to person, place, and time.      Cranial Nerves: No cranial nerve deficit.      Sensory: No sensory deficit.          Significant Labs: All pertinent labs within the past 24 hours have been reviewed.  CBC:   Recent Labs   Lab 12/31/23  1738   WBC 5.78   HGB 12.7   HCT 39.0   *     CMP: No results for input(s): "NA", "K", "CL", "CO2", "GLU", "BUN", "CREATININE", "CALCIUM", "PROT", "ALBUMIN", "BILITOT", "ALKPHOS", "AST", "ALT", "ANIONGAP", "EGFRNONAA" in the last 48 hours.    Invalid input(s): "ESTGFAFR[place]"  Urine Studies:   Recent Labs   Lab 12/31/23 1940   COLORU Yellow   APPEARANCEUA Clear   PHUR 7.0   SPECGRAV >1.030*   PROTEINUA Trace*   GLUCUA " Negative   KETONESU Negative   BILIRUBINUA Negative   OCCULTUA 2+*   NITRITE Negative   UROBILINOGEN Negative   LEUKOCYTESUR Negative   RBCUA 14*   WBCUA 1   BACTERIA Negative   SQUAMEPITHEL 2   HYALINECASTS 3*       Significant Imaging: I have reviewed all pertinent imaging results/findings within the past 24 hours.  Assessment/Plan:     Active Hospital Problems    Diagnosis    * labor in third trimester    Twin dichorionic diamniotic placenta    Gestational hypertension, third trimester     Plan:    -Continuing home dose of Labetalol TID.  -Procardia dose had been increased from 60mg to 90mg BID.  I have changed order to include first dose now.  -I have added Hydralazine 10mg po TID and can up titrate as needed based on response.  -prn IV Labetalol and IV Hydralazine per protocol with an eye on HR which has limited IV Labetalol thus far.  -I do believe current elevation in blood pressure is multifactorial including multiple missed doses of her home medication throughout the day given current events coupled with pain.  Urine Pr/Cr is just above normal. Pain control.     VTE Risk Mitigation (From admission, onward)      None                Thank you for your consult. I will follow-up with patient. Please contact us if you have any additional questions.    Han Martinez MD  Department of Hospital Medicine   Formerly [facility], later [facility]

## 2024-01-01 NOTE — PROGRESS NOTES
Pharmacist Dose Adjustment Note    Lorena Green is a 38 y.o. female being treated with Cefoxitin.    Patient Data:    Vital Signs (Most Recent):  Temp: 97.3 °F (36.3 °C) (12/31/23 1854)  Pulse: 67 (12/31/23 2049)  Resp: 17 (12/31/23 1921)  BP: (!) 157/96 (12/31/23 2049)  SpO2: 97 % (12/31/23 2046) Vital Signs (72h Range):  Temp:  [97.3 °F (36.3 °C)-97.5 °F (36.4 °C)]   Pulse:  [53-79]   Resp:  [16-20]   BP: (141-211)/()   SpO2:  [90 %-100 %]      Recent Labs   Lab 12/26/23  0943   CREATININE 0.6     Serum creatinine: 0.6 mg/dL 12/26/23 0943  Estimated creatinine clearance: 115.4 mL/min    Cefoxitin 2 g every 8 hours will be changed to Cefoxitin 2 g every 6 hours due to CrCl > 50 per Renal Dose Adjustment protocol.    Pharmacist's Name: Margarita Tucker  Pharmacist's Extension: [phone]

## 2024-01-01 NOTE — ANESTHESIA POSTPROCEDURE EVALUATION
Anesthesia Post Evaluation    Patient: Lorena Green    Procedure(s) Performed: Procedure(s) (LRB):   SECTION (N/A)    Final Anesthesia Type: general      Patient location during evaluation: labor & delivery  Patient participation: Yes- Able to Participate  Level of consciousness: awake and alert  Post-procedure vital signs: reviewed and stable  Pain management: adequate  Airway patency: patent    PONV status at discharge: No PONV  Anesthetic complications: no      Cardiovascular status: blood pressure returned to baseline and stable  Respiratory status: unassisted and nasal cannula  Hydration status: euvolemic  Follow-up not needed.  Comments: Patient presented in labor.  First baby delivered precipitously, and second baby was breech and required emergency .  Patient did well with GETA.  She will be on a PCA for postop pain control.  We will follow again tomorrow.              Vitals Value Taken Time   /116 23   Temp 36.3 °C (97.3 °F) 23 1854   Pulse 71 23   Resp 16 23 1919   SpO2 99 % 23   Vitals shown include unvalidated device data.      No case tracking events are documented in the log.      Pain/Baljeet Score: Pain Rating Prior to Med Admin: 9 (2023  7:19 PM)

## 2024-01-01 NOTE — LACTATION NOTE
01/01/24 1300   Maternal Assessment   Breast Density Bilateral:;soft   Areola Bilateral:;elastic   Nipples Bilateral:;everted   Equipment Type   Breast Pump Type double electric, hospital grade   Breast Pump Flange Type hard   Breast Pump Flange Size 24 mm   Breast Pumping   Breast Pumping Interventions early pumping promoted;frequent pumping encouraged   Breast Pumping double electric breast pump utilized;hand expression utilized     Reviewed NICU pumping instructions and assisted patient with pumping session. Demonstrated hand expression after pumping. Collected 0.5 ml colostrum via pumping and hand expression. Assisted to clean pump parts and instructed to pump at least 8 time in 24 hours to stimulate adequate milk production. Encouraged to call me for any further pumping assistance. Patient verbalizes understanding of all instructions with good recall.    Mother was taught hand expression of breastmilk/colostrum. She was instructed to:  Sit upright and lean forward, if possible.  When feasible, apply warm, wet compress over breasts for a few minutes.   Perform gentle breast massage.  Form a C with her hand and place it about 1 inch back from the areola with the nipple centered between her index finger and her thumb.  Press, compress, relax:  Using her finger and thumb, apply pressure in an inward direction toward the breast without stretching the tissue, compress the breast tissue between her finger and thumb, then relax her finger and thumb. Repeat process for a few minutes.  Rotate placement of finger and thumb on the breasts to facilitate emptying.  Collect expressed breastmilk/colostrum with a spoon or cup and feed immediately to the baby, if able.  If unable to feed immediately, place breastmilk/colostrum directly into a sterile storage container for later use. Place the babys breast milk label (with the date and time of collection and the names of mother's medications) on the container. Reviewed  proper handling and storage of expressed breastmilk.   Patient effectively return demonstrated and verbalized understanding.

## 2024-01-01 NOTE — PROGRESS NOTES
Postpartum Rounds    OB PROBLEM LIST:  Di/di twins  Late entry to care @ 23wks  HTN on labetalol and procardia antepartum  AMA  Syphilis in 2014, treated  H/o PTD @ 32wks in   H/o IUFD 32wks in   Anemia   GERD    Subjective    ambulating without difficulty: has not been OOB since   tolerating a normal diet: yes, just had breakfast  voiding normally: no, mallory in place  pain management adequate: yes, taking dilaudid PCA  lochia is appropriate  Pumping for babies who are in NICU    Denies chest pain, SOB, or leg pain.  Recently took tylenol for a HA - says it's a typical headache.  Overnight had blurry vision, but no visual sxs currently.  No n/v.  Some mild epigastric pain.    She does not have HTN outside of pregnancy.  Presented for OB care at 23wks and had /86.  No prior pre-eclmpsia or HTN in pregnancy.  New paternity.     Objective    Delivery Date and Time: 31 DEC 2023 at around 1730hrs  Delivery Type: precipitous  twin A then PLTCS twin B under GETA for breech, 's  Lacerations: none  MBT: B positive  Rubella: Immune    Twin A / boy: Apgars 9/9, 2459g  Twin B / girl: Apgars 7/9, 1955g    Labetalol: 300mg QAM, 300mg Q noon, 200mg QHS  Nifedipine XL: 90mg BID  Hydralazine: 10mg Q8hrs    --> on a dilaudid PCA  --> continues on cefoxitin 2g IV q6h x 4 doses    Vitals: 164/97   65   98.2   100% on RA  UOP 1750cc from 0700 to 0920hrs this morning    GEN = nad, alert/oriented, pleasant, sitting up in bed  BREASTS = deferred  PULM = normal respiratory effort  ABD = soft, appropriately ttp, nondistended, fundus firm at U, incision bandaged with Mepilex - no drainage   = deferred  EXT = no CT, SCD's functioning    Delivery QBL = 450cc    Lab Date: 31 DEC 2023   WBC 6   HGB 13   HCT 39           Assessment and Plan    38 y.o. yo G6 now , postpartum day 1 status post  and emergent PLTCS @ 35+6wks.  Pre-eclampsia with SF (blood pressure, thrombocytopenia).    -start  magnesium for seizure prophylaxis; send CBC and CMP now    -will consult with anesthesia re PCA management; likely can d/c soon and transition to po pain management    -diet as tolerated    -Rhogam not indicated; MMR not indicated      MD ALL    ADDENDUM:  Lab Date: 01 JAN 2024   WBC 14   HGB 13   HCT 40         Lab Date: 01 JAN 2024   Creatinine 0.6   AST 60   ALT 20   Tot Bilirubin 0.2     --> discontinued lunch-time labetalol d/t /82   HR 64  --> discontinue hydralazine  --> monitor BP's closely before administering anti-hypertensives  --> will continue with labetalol 300 BID and Procardia 90 BID (with low threshold to reduce dosing)    MD ALL

## 2024-01-01 NOTE — NURSING
Patient states she is feeling lightheaded, post start of MGSO4, repeat of blood pressure 122/76, 61,97% , nurse to remain at bedside.

## 2024-01-01 NOTE — NURSING
Astrostarhony pump, tubing, collections containers and labels brought to bedside.  Discussed proper pump setting of initiation phase.  Instructed on proper usage of pump and to adjust suction according to maximum comfort level.  Verified appropriate flange fit.  Educated on the frequency and duration of pumping in order to promote and maintain a full milk supply.  Hands on pumping technique reviewed.  Encouraged hand expression after pumping.  Instructed on cleaning of breast pump parts.  Written instructions also given.  Pt verbalized understanding and appropriate recall for proper milk handling, collection, labeling, storage and transportation.

## 2024-01-02 LAB
ALBUMIN SERPL BCP-MCNC: 3.2 G/DL (ref 3.5–5.2)
ALP SERPL-CCNC: 124 U/L (ref 55–135)
ALT SERPL W/O P-5'-P-CCNC: 21 U/L (ref 10–44)
ANION GAP SERPL CALC-SCNC: 7 MMOL/L (ref 8–16)
AST SERPL-CCNC: 47 U/L (ref 10–40)
BASOPHILS # BLD AUTO: 0.02 K/UL (ref 0–0.2)
BASOPHILS NFR BLD: 0.2 % (ref 0–1.9)
BILIRUB SERPL-MCNC: 0.2 MG/DL (ref 0.1–1)
BUN SERPL-MCNC: 8 MG/DL (ref 6–20)
CALCIUM SERPL-MCNC: 7 MG/DL (ref 8.7–10.5)
CHLORIDE SERPL-SCNC: 100 MMOL/L (ref 95–110)
CO2 SERPL-SCNC: 27 MMOL/L (ref 23–29)
CREAT SERPL-MCNC: 0.5 MG/DL (ref 0.5–1.4)
DIFFERENTIAL METHOD BLD: ABNORMAL
EOSINOPHIL # BLD AUTO: 0 K/UL (ref 0–0.5)
EOSINOPHIL NFR BLD: 0.4 % (ref 0–8)
ERYTHROCYTE [DISTWIDTH] IN BLOOD BY AUTOMATED COUNT: 18.6 % (ref 11.5–14.5)
EST. GFR  (NO RACE VARIABLE): >60 ML/MIN/1.73 M^2
GLUCOSE SERPL-MCNC: 81 MG/DL (ref 70–110)
HCT VFR BLD AUTO: 36.2 % (ref 37–48.5)
HGB BLD-MCNC: 11.8 G/DL (ref 12–16)
IMM GRANULOCYTES # BLD AUTO: 0.04 K/UL (ref 0–0.04)
IMM GRANULOCYTES NFR BLD AUTO: 0.4 % (ref 0–0.5)
LYMPHOCYTES # BLD AUTO: 1.9 K/UL (ref 1–4.8)
LYMPHOCYTES NFR BLD: 16.2 % (ref 18–48)
MAGNESIUM SERPL-MCNC: 5.6 MG/DL (ref 1.6–2.6)
MCH RBC QN AUTO: 29.2 PG (ref 27–31)
MCHC RBC AUTO-ENTMCNC: 32.6 G/DL (ref 32–36)
MCV RBC AUTO: 90 FL (ref 82–98)
MONOCYTES # BLD AUTO: 0.9 K/UL (ref 0.3–1)
MONOCYTES NFR BLD: 7.7 % (ref 4–15)
NEUTROPHILS # BLD AUTO: 8.6 K/UL (ref 1.8–7.7)
NEUTROPHILS NFR BLD: 75.1 % (ref 38–73)
NRBC BLD-RTO: 0 /100 WBC
PLATELET # BLD AUTO: 139 K/UL (ref 150–450)
PMV BLD AUTO: 9.7 FL (ref 9.2–12.9)
POTASSIUM SERPL-SCNC: 4 MMOL/L (ref 3.5–5.1)
PROT SERPL-MCNC: 6.3 G/DL (ref 6–8.4)
RBC # BLD AUTO: 4.04 M/UL (ref 4–5.4)
RPR SER QL: NORMAL
SODIUM SERPL-SCNC: 134 MMOL/L (ref 136–145)
WBC # BLD AUTO: 11.4 K/UL (ref 3.9–12.7)

## 2024-01-02 PROCEDURE — 12000002 HC ACUTE/MED SURGE SEMI-PRIVATE ROOM

## 2024-01-02 PROCEDURE — 63600175 PHARM REV CODE 636 W HCPCS: Performed by: GENERAL PRACTICE

## 2024-01-02 PROCEDURE — 25000003 PHARM REV CODE 250: Performed by: SPECIALIST

## 2024-01-02 PROCEDURE — 25000003 PHARM REV CODE 250: Performed by: GENERAL PRACTICE

## 2024-01-02 PROCEDURE — 85025 COMPLETE CBC W/AUTO DIFF WBC: CPT | Performed by: GENERAL PRACTICE

## 2024-01-02 PROCEDURE — 25000003 PHARM REV CODE 250: Performed by: OBSTETRICS & GYNECOLOGY

## 2024-01-02 PROCEDURE — 80053 COMPREHEN METABOLIC PANEL: CPT | Performed by: GENERAL PRACTICE

## 2024-01-02 PROCEDURE — 36415 COLL VENOUS BLD VENIPUNCTURE: CPT | Performed by: GENERAL PRACTICE

## 2024-01-02 PROCEDURE — 83735 ASSAY OF MAGNESIUM: CPT | Performed by: GENERAL PRACTICE

## 2024-01-02 RX ORDER — IBUPROFEN 400 MG/1
800 TABLET ORAL EVERY 8 HOURS PRN
Status: DISCONTINUED | OUTPATIENT
Start: 2024-01-02 | End: 2024-01-04 | Stop reason: HOSPADM

## 2024-01-02 RX ORDER — NIFEDIPINE 30 MG/1
60 TABLET, EXTENDED RELEASE ORAL DAILY
Status: DISCONTINUED | OUTPATIENT
Start: 2024-01-02 | End: 2024-01-03

## 2024-01-02 RX ADMIN — KETOROLAC TROMETHAMINE 30 MG: 30 INJECTION, SOLUTION INTRAMUSCULAR; INTRAVENOUS at 06:01

## 2024-01-02 RX ADMIN — PANTOPRAZOLE SODIUM 40 MG: 40 TABLET, DELAYED RELEASE ORAL at 08:01

## 2024-01-02 RX ADMIN — KETOROLAC TROMETHAMINE 30 MG: 30 INJECTION, SOLUTION INTRAMUSCULAR; INTRAVENOUS at 12:01

## 2024-01-02 RX ADMIN — NIFEDIPINE 60 MG: 30 TABLET, FILM COATED, EXTENDED RELEASE ORAL at 06:01

## 2024-01-02 RX ADMIN — OXYCODONE HYDROCHLORIDE AND ACETAMINOPHEN 2 TABLET: 5; 325 TABLET ORAL at 02:01

## 2024-01-02 RX ADMIN — IBUPROFEN 800 MG: 400 TABLET, FILM COATED ORAL at 11:01

## 2024-01-02 RX ADMIN — OXYCODONE HYDROCHLORIDE AND ACETAMINOPHEN 2 TABLET: 5; 325 TABLET ORAL at 07:01

## 2024-01-02 RX ADMIN — IBUPROFEN 800 MG: 400 TABLET, FILM COATED ORAL at 07:01

## 2024-01-02 RX ADMIN — MAGNESIUM SULFATE IN WATER 2 G/HR: 40 INJECTION, SOLUTION INTRAVENOUS at 04:01

## 2024-01-02 NOTE — PROGRESS NOTES
[facility]  Obstetrics  Postpartum Progress Note    Patient Name: Lorena Green  MRN: [id number]  Admission Date: 2023  Hospital Length of Stay: 2 days  Attending Physician: Ailyn Ji MD  Primary Care Provider: No, Primary Doctor    Subjective:     Principal Problem: labor in third trimester    Hospital Course:  39yo  di/di twins, CHTN, AMA, arrived in labor completely dilated, 1st baby vertex male, delivered precipitously without physician present, 2nd twin female moved to breech presentation, underwent LTCS under general anesthesia, [facility].     Interval History: S/P  precipitous Twin A and Primary C section for Breech twin B    She is doing well this morning. She is tolerating a regular diet without nausea or vomiting. She is not voiding spontaneously. Ruiz still in place. She is not ambulating. She has passed flatus, and has not a BM. Vaginal bleeding is mild. She denies fever or chills. Abdominal pain is mild and controlled with oral medications. She Is breastfeeding. She desires circumcision for her male baby: yes.    Objective:     Vital Signs (Most Recent):  Temp: 98.2 °F (36.8 °C) (24)  Pulse: 86 (24 0700)  Resp: 16 (24)  BP: 115/75 (24 0526)  SpO2: 96 % (24 0700) Vital Signs (24h Range):  Temp:  [97.7 °F (36.5 °C)-98.2 °F (36.8 °C)] 98.2 °F (36.8 °C)  Pulse:  [] 86  Resp:  [16-18] 16  SpO2:  [88 %-100 %] 96 %  BP: (101-164)/(63-97) 115/75     Weight: 68.5 kg (151 lb)  Body mass index is 27.62 kg/m².      Intake/Output Summary (Last 24 hours) at 2024 0732  Last data filed at 2024 0600  Gross per 24 hour   Intake 918.75 ml   Output 8150 ml   Net -7231.25 ml         Significant Labs:  Lab Results   Component Value Date    GROUPTRH B POS 2023    STREPBCULT No Group B Streptococcus isolated 2023     Recent Labs   Lab 24  0643   HGB 11.8*   HCT 36.2*       CBC:   Recent Labs   Lab  24  0643   WBC 11.40   RBC 4.04   HGB 11.8*   HCT 36.2*   *   MCV 90   MCH 29.2   MCHC 32.6     I have personallly reviewed all pertinent lab results from the last 24 hours.    Physical Exam  Doing well. No complaints  BPs look great. Mag will be turned off  Dressing dry  Extr no homans or edema    Review of Systems  Assessment/Plan:     38 y.o. female  for:    Delivery by emergency  section  Postpartum Day 2  and Primary C section for breech Twin B and BTL- on Mag for elevated BPs- will stop Mag and watch BPs and continue PP care. Remove mallory after eats - will see if needs to restart BP meds        Disposition: As patient meets milestones, will plan to discharge 1-2 days.    Ailyn Ji MD  Obstetrics  [facility]

## 2024-01-02 NOTE — SUBJECTIVE & OBJECTIVE
Interval History: S/P  precipitous Twin A and Primary C section for Breech twin B    She is doing well this morning. She is tolerating a regular diet without nausea or vomiting. She is not voiding spontaneously. Ruiz still in place. She is not ambulating. She has passed flatus, and has not a BM. Vaginal bleeding is mild. She denies fever or chills. Abdominal pain is mild and controlled with oral medications. She Is breastfeeding. She desires circumcision for her male baby: yes.    Objective:     Vital Signs (Most Recent):  Temp: 98.2 °F (36.8 °C) (24)  Pulse: 86 (24 0700)  Resp: 16 (24)  BP: 115/75 (24 05)  SpO2: 96 % (24) Vital Signs (24h Range):  Temp:  [97.7 °F (36.5 °C)-98.2 °F (36.8 °C)] 98.2 °F (36.8 °C)  Pulse:  [] 86  Resp:  [16-18] 16  SpO2:  [88 %-100 %] 96 %  BP: (101-164)/(63-97) 115/75     Weight: 68.5 kg (151 lb)  Body mass index is 27.62 kg/m².      Intake/Output Summary (Last 24 hours) at 2024 0732  Last data filed at 2024 0600  Gross per 24 hour   Intake 918.75 ml   Output 8150 ml   Net -7231.25 ml         Significant Labs:  Lab Results   Component Value Date    GROUPTRH B POS 2023    STREPBCULT No Group B Streptococcus isolated 2023     Recent Labs   Lab 24  0643   HGB 11.8*   HCT 36.2*       CBC:   Recent Labs   Lab 24  0643   WBC 11.40   RBC 4.04   HGB 11.8*   HCT 36.2*   *   MCV 90   MCH 29.2   MCHC 32.6     I have personallly reviewed all pertinent lab results from the last 24 hours.    Physical Exam  Doing well. No complaints  BPs look great. Mag will be turned off  Dressing dry  Extr no homans or edema    Review of Systems

## 2024-01-02 NOTE — NURSING
Dr. Ji @ bs.  Ok to d/c magnesium and mallory.  Will monitor bps then transfer pt to 2100 in a few hours.  Pt in stable condition without c/o.

## 2024-01-02 NOTE — NURSING TRANSFER
Nursing Transfer Note      1/2/2024   2:35 PM    Nurse giving handoff:Kaylyn Basurto RNC  Nurse receiving handoff:CASSIA Ramirez RN    Reason patient is being transferred: delivered    Transfer To: 2105    Transfer via wheelchair    Transfer with belongings    Transported by Kaylyn Basurto RNC    Transfer Vital Signs:  Blood Pressure:123/79  Heart Rate:84  O2:99  Temperature:97.7  Respirations:16    Telemetry: na  Order for Tele Monitor? No    Additional Lines: none    4eyes on Skin: yes    Medicines sent: protonix    Any special needs or follow-up needed: none    Patient belongings transferred with patient: Yes    Chart send with patient: Yes    Notified: spouse    Patient reassessed at: 1410 1/2/24    Upon arrival to floor: patient oriented to room, call bell in reach, and bed in lowest position

## 2024-01-02 NOTE — ASSESSMENT & PLAN NOTE
Postpartum Day 2  and Primary C section for breech Twin B and BTL- on Mag for elevated BPs- will stop Mag and watch BPs and continue PP care. Remove mallory after eats - will see if needs to restart BP meds

## 2024-01-03 PROCEDURE — 25000003 PHARM REV CODE 250: Performed by: OBSTETRICS & GYNECOLOGY

## 2024-01-03 PROCEDURE — 12000002 HC ACUTE/MED SURGE SEMI-PRIVATE ROOM

## 2024-01-03 PROCEDURE — 25000003 PHARM REV CODE 250: Performed by: GENERAL PRACTICE

## 2024-01-03 PROCEDURE — 25000003 PHARM REV CODE 250: Performed by: SPECIALIST

## 2024-01-03 RX ORDER — NIFEDIPINE 30 MG/1
60 TABLET, EXTENDED RELEASE ORAL 2 TIMES DAILY
Status: DISCONTINUED | OUTPATIENT
Start: 2024-01-03 | End: 2024-01-04 | Stop reason: HOSPADM

## 2024-01-03 RX ORDER — DOCUSATE SODIUM 100 MG/1
200 CAPSULE, LIQUID FILLED ORAL 2 TIMES DAILY
Status: DISCONTINUED | OUTPATIENT
Start: 2024-01-03 | End: 2024-01-04 | Stop reason: HOSPADM

## 2024-01-03 RX ADMIN — DOCUSATE SODIUM 200 MG: 100 CAPSULE, LIQUID FILLED ORAL at 10:01

## 2024-01-03 RX ADMIN — NIFEDIPINE 60 MG: 30 TABLET, FILM COATED, EXTENDED RELEASE ORAL at 12:01

## 2024-01-03 RX ADMIN — OXYCODONE HYDROCHLORIDE AND ACETAMINOPHEN 2 TABLET: 5; 325 TABLET ORAL at 10:01

## 2024-01-03 RX ADMIN — PANTOPRAZOLE SODIUM 40 MG: 40 TABLET, DELAYED RELEASE ORAL at 09:01

## 2024-01-03 RX ADMIN — IBUPROFEN 800 MG: 400 TABLET, FILM COATED ORAL at 12:01

## 2024-01-03 RX ADMIN — NIFEDIPINE 60 MG: 30 TABLET, FILM COATED, EXTENDED RELEASE ORAL at 10:01

## 2024-01-03 RX ADMIN — IBUPROFEN 800 MG: 400 TABLET, FILM COATED ORAL at 03:01

## 2024-01-03 RX ADMIN — OXYCODONE HYDROCHLORIDE AND ACETAMINOPHEN 2 TABLET: 5; 325 TABLET ORAL at 05:01

## 2024-01-03 RX ADMIN — OXYCODONE HYDROCHLORIDE AND ACETAMINOPHEN 2 TABLET: 5; 325 TABLET ORAL at 08:01

## 2024-01-03 RX ADMIN — DOCUSATE SODIUM 200 MG: 100 CAPSULE, LIQUID FILLED ORAL at 09:01

## 2024-01-03 RX ADMIN — IBUPROFEN 800 MG: 400 TABLET, FILM COATED ORAL at 10:01

## 2024-01-03 NOTE — ASSESSMENT & PLAN NOTE
Postpartum Day 2  and Primary C section for breech Twin B and BTL- on Mag for elevated BPs- will stop Mag and watch BPs and continue PP care. Remove mallory after eats - will see if needs to restart BP meds    Postpartum and postoperative day 2.  Patient's blood pressure started to creep up last night.  Started on Procardia XL 60 mg.  Will see if she needs to do that daily or she may need to increase that to twice a day.  Continue in-house management for now.  Likely discharge home tomorrow.  Rh positive   [Restricted in physically strenuous activity but ambulatory and able to carry out work of a light or sedentary nature] : Status 1- Restricted in physically strenuous activity but ambulatory and able to carry out work of a light or sedentary nature, e.g., light house work, office work [Thin] : thin [Normal] : normal appearance, no rash, nodules, vesicles, ulcers, erythema [Ulcers] : no ulcers [Mucositis] : no mucositis [Thrush] : no thrush [Vesicles] : no vesicles [de-identified] : Post op incision scar helaing well  [de-identified] : occasional lower back pain  [de-identified] : pins and needles on both hands, dropping things lately

## 2024-01-03 NOTE — PROGRESS NOTES
[facility]  Obstetrics  Postpartum Progress Note    Patient Name: Lorena Green  MRN: [id number]  Admission Date: 2023  Hospital Length of Stay: 3 days  Attending Physician: Ailyn Ji MD  Primary Care Provider: No, Primary Doctor    Subjective:     Principal Problem: labor in third trimester    Hospital Course:  39yo  di/di twins, CHTN, AMA, arrived in labor completely dilated, 1st baby vertex male, delivered precipitously without physician present, 2nd twin female moved to breech presentation, underwent LTCS under general anesthesia, [facility].     Interval History:  Postpartum day 3 and postoperative day 3 spontaneous vaginal delivery precipitous and primary  section for twin B breech    She is doing well this morning. She is tolerating a regular diet without nausea or vomiting. She is voiding spontaneously. She is ambulating. She has passed flatus, and has not a BM. Vaginal bleeding is mild. She denies fever or chills. Abdominal pain is moderate and controlled with oral medications. She Is breastfeeding. She desires circumcision for her male baby: yes.    Objective:     Vital Signs (Most Recent):  Temp: 99.2 °F (37.3 °C) (24)  Pulse: 73 (24)  Resp: 18 (24)  BP: 135/87 (24)  SpO2: 99 % (24) Vital Signs (24h Range):  Temp:  [97.4 °F (36.3 °C)-99.2 °F (37.3 °C)] 99.2 °F (37.3 °C)  Pulse:  [71-88] 73  Resp:  [16-18] 18  SpO2:  [98 %-100 %] 99 %  BP: (121-153)/() 135/87     Weight: 68.5 kg (151 lb)  Body mass index is 27.62 kg/m².      Intake/Output Summary (Last 24 hours) at 1/3/2024 0747  Last data filed at 2024 0823  Gross per 24 hour   Intake --   Output 300 ml   Net -300 ml         Significant Labs:  Lab Results   Component Value Date    GROUPTRH B POS 2023    STREPBCULT No Group B Streptococcus isolated 2023     Recent Labs   Lab 24  0643   HGB 11.8*   HCT 36.2*       CBC:    Recent Labs   Lab 24  0643   WBC 11.40   RBC 4.04   HGB 11.8*   HCT 36.2*   *   MCV 90   MCH 29.2   MCHC 32.6     I have personallly reviewed all pertinent lab results from the last 24 hours.    Physical Exam  Patient is sleeping when I arrived   Patient was started on Procardia XL 60 mg last night for blood pressure is 140s to 150s over 90s to 100s   Denies headache or visual changes.  Reports positive flatus  Blood pressure this morning 135/87   Dressing dry   Extremities no Homans or edema    Review of Systems  Assessment/Plan:     38 y.o. female  for:    Delivery by emergency  section  Postpartum Day 2  and Primary C section for breech Twin B and BTL- on Mag for elevated BPs- will stop Mag and watch BPs and continue PP care. Remove mallory after eats - will see if needs to restart BP meds    Postpartum and postoperative day 2.  Patient's blood pressure started to creep up last night.  Started on Procardia XL 60 mg.  Will see if she needs to do that daily or she may need to increase that to twice a day.  Continue in-house management for now.  Likely discharge home tomorrow.  Rh positive        Disposition: As patient meets milestones, will plan to discharge tomorrow.    Ailyn Ji MD  Obstetrics  [facility]

## 2024-01-03 NOTE — SUBJECTIVE & OBJECTIVE
Interval History:  Postpartum day 3 and postoperative day 3 spontaneous vaginal delivery precipitous and primary  section for twin B breech    She is doing well this morning. She is tolerating a regular diet without nausea or vomiting. She is voiding spontaneously. She is ambulating. She has passed flatus, and has not a BM. Vaginal bleeding is mild. She denies fever or chills. Abdominal pain is moderate and controlled with oral medications. She Is breastfeeding. She desires circumcision for her male baby: yes.    Objective:     Vital Signs (Most Recent):  Temp: 99.2 °F (37.3 °C) (24)  Pulse: 73 (24)  Resp: 18 (24)  BP: 135/87 (24)  SpO2: 99 % (24) Vital Signs (24h Range):  Temp:  [97.4 °F (36.3 °C)-99.2 °F (37.3 °C)] 99.2 °F (37.3 °C)  Pulse:  [71-88] 73  Resp:  [16-18] 18  SpO2:  [98 %-100 %] 99 %  BP: (121-153)/() 135/87     Weight: 68.5 kg (151 lb)  Body mass index is 27.62 kg/m².      Intake/Output Summary (Last 24 hours) at 1/3/2024 0747  Last data filed at 2024 0823  Gross per 24 hour   Intake --   Output 300 ml   Net -300 ml         Significant Labs:  Lab Results   Component Value Date    GROUPTRH B POS 2023    STREPBCULT No Group B Streptococcus isolated 2023     Recent Labs   Lab 24  0643   HGB 11.8*   HCT 36.2*       CBC:   Recent Labs   Lab 24  0643   WBC 11.40   RBC 4.04   HGB 11.8*   HCT 36.2*   *   MCV 90   MCH 29.2   MCHC 32.6     I have personallly reviewed all pertinent lab results from the last 24 hours.    Physical Exam  Patient is sleeping when I arrived   Patient was started on Procardia XL 60 mg last night for blood pressure is 140s to 150s over 90s to 100s   Denies headache or visual changes.  Reports positive flatus  Blood pressure this morning 135/87   Dressing dry   Extremities no Homans or edema    Review of Systems

## 2024-01-04 VITALS
BODY MASS INDEX: 27.79 KG/M2 | WEIGHT: 151 LBS | DIASTOLIC BLOOD PRESSURE: 77 MMHG | OXYGEN SATURATION: 99 % | RESPIRATION RATE: 18 BRPM | HEART RATE: 95 BPM | TEMPERATURE: 99 F | SYSTOLIC BLOOD PRESSURE: 118 MMHG | HEIGHT: 62 IN

## 2024-01-04 PROCEDURE — 25000003 PHARM REV CODE 250: Performed by: SPECIALIST

## 2024-01-04 PROCEDURE — 25000003 PHARM REV CODE 250: Performed by: OBSTETRICS & GYNECOLOGY

## 2024-01-04 PROCEDURE — 25000003 PHARM REV CODE 250: Performed by: GENERAL PRACTICE

## 2024-01-04 RX ORDER — LABETALOL 100 MG/1
100 TABLET, FILM COATED ORAL 3 TIMES DAILY
Qty: 90 TABLET | Refills: 11 | Status: SHIPPED | OUTPATIENT
Start: 2024-01-04 | End: 2025-01-03

## 2024-01-04 RX ORDER — LABETALOL 100 MG/1
100 TABLET, FILM COATED ORAL 2 TIMES DAILY
Status: DISCONTINUED | OUTPATIENT
Start: 2024-01-04 | End: 2024-01-04 | Stop reason: HOSPADM

## 2024-01-04 RX ORDER — NIFEDIPINE 60 MG/1
60 TABLET, EXTENDED RELEASE ORAL 2 TIMES DAILY
Qty: 60 TABLET | Refills: 11 | Status: SHIPPED | OUTPATIENT
Start: 2024-01-04 | End: 2025-01-03

## 2024-01-04 RX ORDER — OXYCODONE AND ACETAMINOPHEN 5; 325 MG/1; MG/1
2 TABLET ORAL EVERY 4 HOURS PRN
Qty: 28 TABLET | Refills: 0 | Status: SHIPPED | OUTPATIENT
Start: 2024-01-04

## 2024-01-04 RX ORDER — LABETALOL 100 MG/1
100 TABLET, FILM COATED ORAL 3 TIMES DAILY
Status: DISCONTINUED | OUTPATIENT
Start: 2024-01-04 | End: 2024-01-04

## 2024-01-04 RX ADMIN — PANTOPRAZOLE SODIUM 40 MG: 40 TABLET, DELAYED RELEASE ORAL at 08:01

## 2024-01-04 RX ADMIN — OXYCODONE HYDROCHLORIDE AND ACETAMINOPHEN 1 TABLET: 5; 325 TABLET ORAL at 10:01

## 2024-01-04 RX ADMIN — DOCUSATE SODIUM 200 MG: 100 CAPSULE, LIQUID FILLED ORAL at 08:01

## 2024-01-04 RX ADMIN — LABETALOL HYDROCHLORIDE 100 MG: 100 TABLET, FILM COATED ORAL at 09:01

## 2024-01-04 RX ADMIN — NIFEDIPINE 60 MG: 30 TABLET, FILM COATED, EXTENDED RELEASE ORAL at 08:01

## 2024-01-04 RX ADMIN — IBUPROFEN 800 MG: 400 TABLET, FILM COATED ORAL at 08:01

## 2024-01-04 NOTE — LACTATION NOTE
01/03/24 1830   Maternal Assessment   Breast Density Bilateral:;filling   Areola Bilateral:;elastic   Nipples Bilateral:;everted   Equipment Type   Breast Pump Type double electric, hospital grade   Breast Pump Flange Type hard   Breast Pump Flange Size 24 mm   Breast Pumping   Breast Pumping Interventions frequent pumping encouraged   Breast Pumping double electric breast pump utilized     Patient pumping for twins in NICU. Breasts are filling and patient collected 2 ounces EBM with last pumping session. Reviewed NICU pumping instructions and emphasized importance of pumping at least 8 times in 24 hours to stimulate adequate milk production. Patient verbalizes understanding of all instructions with good recall. Pump parts sterilized.

## 2024-01-04 NOTE — NURSING
Discharge order received. Instructions with education printed and given to patient. Patient verbally educated on discharge instructions. All questions are answered and denies needs at this time.

## 2024-01-04 NOTE — DISCHARGE SUMMARY
"[facility]  Obstetrics  Discharge Summary      Patient Name: Lorena Green  MRN: [id number]  Admission Date: 2023  Hospital Length of Stay: 4 days  Discharge Date and Time:  2024 8:49 AM  Attending Physician: Ailyn Ji MD   Discharging Provider: Ailyn Ji MD   Primary Care Provider: Ani, Primary Doctor    HPI: No notes on file        Procedure(s) (LRB):   SECTION (N/A)     Hospital Course:   37yo  di/di twins, CHTN, AMA, arrived in labor completely dilated, 1st baby vertex male, delivered precipitously without physician present, 2nd twin female moved to breech presentation, underwent LTCS under general anesthesia, [facility].      Consults (From admission, onward)          Status Ordering Provider     Inpatient consult to Hospitalist  Once        Provider:  Han Martinez MD Acknowledged CLAVIN, DIANA K.            Final Active Diagnoses:    Diagnosis Date Noted POA    PRINCIPAL PROBLEM:   labor in third trimester [O60.03] 2023 Yes    Delivery by emergency  section [O99.892] 2024 No    Twin dichorionic diamniotic placenta [O30.049] 2023 Yes    Gestational hypertension, third trimester [O13.3] 2023 Yes      Problems Resolved During this Admission:        Significant Diagnostic Studies: Labs: CBC No results for input(s): "WBC", "HGB", "HCT", "PLT" in the last 48 hours.  Lab Results   Component Value Date    GROUPTRH B POS 2023         Feeding Method: both breast and bottle    Immunizations       None          Patient feeling okay denies headache or visual changes   Blood pressures are creeping up again this morning.  She is on Procardia XL 60 mg twice a day.  Patient is baby girl got transferred yesterday to [facility]. I know this patient has what seems to be very compensatory reactions with her blood pressure to stress and being upset.  We are going to start labetalol 100 mg 3 times a day at this " "point.  Passing flatus   Abdomen is soft dressing dry   Extremities no Homans or edema    Assessment and plan:  Postoperative day 4 emergency  section of twin B breech presentation and bilateral tubal ligation.  Known chronic hypertension with labile blood pressures at this point.  I feel she is safe to go home if her blood pressures are 140s over 90s or less.  She will continue on the Procardia XL 60 mg twice a day.  As well as labetalol 100 mg 3 times a day.  She will increase to 200 mg 3 times a day upon taking blood pressures at home if blood pressures greater than 150s over 100s.  Strict precautions to come to the hospital with headaches or visual changes.  Percocet at home for pain.  Rh positive.  I will see her next week for blood pressure check and dressing removal.  I have advised patient I am on-call here all weekend if she needs anything let postpartum her labor and delivery no when they will get in touch with me.     ANJALI Green Boy Lorena [[id number]]     Delivery:    Episiotomy: None   Lacerations: None   Repair suture:     Repair # of packets: 0   Blood loss (ml):       Birth information:  YOB: 2023   Time of birth: 5:11 PM   Sex: male   Delivery type: Vaginal, Spontaneous   Gestational Age: 35w6d     Measurements    Weight: 2459 g  Weight (lbs): 5 lb 6.7 oz  Length: 44.5 cm  Length (in): 17.5"         Delivery Clinician: Delivery Providers    Delivering clinician:    Provider Role    Oriana Duvall RN Registered Nurse    Karly Ruiz, MONICAP Nurse Practitioner    Alie Powers RN Registered Nurse    Sarai Alejandra RN Registered Nurse             Additional  information:  Forceps:    Vacuum:    Breech:    Observed anomalies      Living?:     Apgars    Living status: Living  Apgar Component Scores:  1 min.:  5 min.:  10 min.:  15 min.:  20 min.:    Skin color:  1  1       Heart rate:  2  2       Reflex irritability:  2  2       Muscle tone:  2  2       Respiratory effort:  2  2    " "   Total:  9  9       Apgars assigned by: KARLY BENITEZ NNP         Placenta: Delivered:       appearance     SHILPA Green Girl Lorena [[id number]]     Delivery:    Episiotomy: None   Lacerations: None   Repair suture:     Repair # of packets: 0   Blood loss (ml):       Birth information:  YOB: 2023   Time of birth: 5:28 PM   Sex: female   Delivery type: , Low Transverse   Gestational Age: 35w6d     Measurements    Weight: 1955 g  Weight (lbs): 4 lb 5 oz  Length: 43.2 cm  Length (in): 17"         Delivery Clinician: Delivery Providers    Delivering clinician: Vinita Ac MD   Provider Role    Alie Powers RN Registered Nurse    Blossom Ramirez RN Registered Nurse    Karly Benitez NNP Nurse Practitioner    Halie Atkins RN Registered Nurse             Additional  information:  Forceps:    Vacuum:    Breech:    Observed anomalies      Living?:     Apgars    Living status: Living  Apgar Component Scores:  1 min.:  5 min.:  10 min.:  15 min.:  20 min.:    Skin color:  1  1       Heart rate:  2  2       Reflex irritability:  2  2       Muscle tone:  1  2       Respiratory effort:  1  2       Total:  7  9       Apgars assigned by: KARLY BENITEZ         Placenta: Delivered:       appearance  Pending Diagnostic Studies:       Procedure Component Value Units Date/Time    Specimen to Pathology - Surgery [[id number]] Collected: 23    Order Status: Sent Lab Status: No result     Specimen: Tissue             Discharged Condition: good    Disposition: Home or Self Care    Follow Up:    Patient Instructions:      BREAST PUMP FOR HOME USE     Order Specific Question Answer Comments   Type of pump: Electric    Weight: 68.5 kg (151 lb)    Length of need (1-99 months): 99      Diet Adult Regular     No driving until:   Order Comments: 14 days for vaginal delivery  28 days for  Section     No dressing needed     Leave dressing on - Keep it clean, dry, and intact until clinic " visit     Activity as tolerated   Order Comments: Pelvic rest x 6 weeks     Medications:  Current Discharge Medication List        START taking these medications    Details   oxyCODONE-acetaminophen (PERCOCET) 5-325 mg per tablet Take 2 tablets by mouth every 4 (four) hours as needed.  Qty: 28 tablet, Refills: 0    Comments: Quantity prescribed more than 7 day supply? Yes, quantity medically necessary           CONTINUE these medications which have CHANGED    Details   labetaloL (NORMODYNE) 100 MG tablet Take 1 tablet (100 mg total) by mouth 3 (three) times daily.  Qty: 90 tablet, Refills: 11    Comments: .      NIFEdipine (PROCARDIA-XL) 60 MG (OSM) 24 hr tablet Take 1 tablet (60 mg total) by mouth 2 (two) times a day.  Qty: 60 tablet, Refills: 11    Comments: .           CONTINUE these medications which have NOT CHANGED    Details   aspirin 81 MG Chew Take 81 mg by mouth once daily.      docusate sodium (COLACE) 100 MG capsule Take 100 mg by mouth 2 (two) times daily.      famotidine (PEPCID) 40 MG tablet take 1 tablet by mouth twice daily  Qty: 60 tablet, Refills: 0      ferrous gluconate (FERGON) 324 MG tablet Take 225 mg by mouth daily with breakfast.      metroNIDAZOLE (FLAGYL) 500 MG tablet Take 1 tablet (500 mg total) by mouth every 12 (twelve) hours.  Qty: 13 tablet, Refills: 0      multivitamin capsule Take 1 capsule by mouth once daily.      pantoprazole (PROTONIX) 40 MG tablet Take 40 mg by mouth once daily.      butalbital-acetaminophen-caffeine -40 mg (FIORICET, ESGIC) -40 mg per tablet take 1 tablet by mouth every 4 to 6 hours as needed for headaches  Qty: 20 tablet, Refills: 0      traMADoL (ULTRAM) 50 mg tablet Take 50 mg by mouth.             Ailyn Ji MD  Obstetrics  Formerly [facility], 1928–1983

## 2024-01-04 NOTE — PLAN OF CARE
01/04/24 1043   Final Note   Assessment Type Final Discharge Note   Anticipated Discharge Disposition Home   What phone number can be called within the next 1-3 days to see how you are doing after discharge? [phone]   Post-Acute Status   Discharge Delays None known at this time     Discharge orders and chart reviewed with no further post-acute discharge needs identified at this time.  At this time, patient is cleared for discharge from Case Management standpoint.       
  Problem: Adult Inpatient Plan of Care  Goal: Plan of Care Review  Outcome: Met  Goal: Patient-Specific Goal (Individualized)  Outcome: Met  Goal: Absence of Hospital-Acquired Illness or Injury  Outcome: Met  Goal: Optimal Comfort and Wellbeing  Outcome: Met  Goal: Readiness for Transition of Care  Outcome: Met     Problem:  Fall Injury Risk  Goal: Absence of Fall, Infant Drop and Related Injury  Outcome: Met     Problem: Infection  Goal: Absence of Infection Signs and Symptoms  Outcome: Met     Problem: Adjustment to Role Transition (Postpartum  Delivery)  Goal: Successful Maternal Role Transition  Outcome: Met     Problem: Bleeding (Postpartum  Delivery)  Goal: Hemostasis  Outcome: Met     Problem: Infection (Postpartum  Delivery)  Goal: Absence of Infection Signs and Symptoms  Outcome: Met     Problem: Pain (Postpartum  Delivery)  Goal: Acceptable Pain Control  Outcome: Met     Problem: Postoperative Nausea and Vomiting (Postpartum  Delivery)  Goal: Nausea and Vomiting Relief  Outcome: Met     Problem: Postoperative Urinary Retention (Postpartum  Delivery)  Goal: Effective Urinary Elimination  Outcome: Met     Problem: Breastfeeding  Goal: Effective Breastfeeding  Outcome: Met     
  Problem: Adult Inpatient Plan of Care  Goal: Plan of Care Review  Outcome: Ongoing, Progressing  Goal: Patient-Specific Goal (Individualized)  Outcome: Ongoing, Progressing  Goal: Absence of Hospital-Acquired Illness or Injury  Outcome: Ongoing, Progressing  Goal: Optimal Comfort and Wellbeing  Outcome: Ongoing, Progressing  Goal: Readiness for Transition of Care  Outcome: Ongoing, Progressing     Problem:  Fall Injury Risk  Goal: Absence of Fall, Infant Drop and Related Injury  Outcome: Ongoing, Progressing     Problem: Infection  Goal: Absence of Infection Signs and Symptoms  Outcome: Ongoing, Progressing     Problem: Adjustment to Role Transition (Postpartum  Delivery)  Goal: Successful Maternal Role Transition  Outcome: Ongoing, Progressing     Problem: Bleeding (Postpartum  Delivery)  Goal: Hemostasis  Outcome: Ongoing, Progressing     Problem: Infection (Postpartum  Delivery)  Goal: Absence of Infection Signs and Symptoms  Outcome: Ongoing, Progressing     Problem: Pain (Postpartum  Delivery)  Goal: Acceptable Pain Control  Outcome: Ongoing, Progressing     
.  
[facility]  Discharge Assessment    Primary Care Provider: No, Primary Doctor   OB Screen completed and no needs identified at this time.  White board in room updated with contact information, and mother was encouraged to contact office if further needs arise.    OB Screen (most recent)       OB Screen - 24 1523          OB SCREEN    Assessment Type Discharge Planning Assessment     Source of Information patient     Received Prenatal Care Yes     Any indications/suspicions for None     Is this a teen pregnancy No     Is the baby in NICU Yes     Indication for adoption/Safe Haven No     Indication for DME/post-acute needs No     HIV (+) No     Any congenital  disorders No     Fetal demise/ death No                           
Patient verbalizes understanding of NICU pumping care plan.   
yes

## 2024-01-04 NOTE — DISCHARGE INSTRUCTIONS
Pelvic rest for 6 weeks (no sex, tampons, douching, nothing in the vagina)    You can experience vaginal bleeding on and off for up to 6 weeks, it will gradually get lighter and the color will change from bright red to a brownish discharge towards the end.    Activity:  NO strenuous activities or exercising for 6 weeks.  Do not /lift anything over 15 pounds and no heavy housework or cleaning for 6 weeks.  Limit stair climbing to twice a day during the first 2 weeks.   NO driving for 4 weeks.  You may take short car trips but do not drive.    You may shower ONLY for the first 2 weeks, after 2 weeks you can soak in a bathtub.  Use a mild soap, no heavy perfumes or fragrances to avoid irritation.     Walking frequently following a  delivery promotes healing and decreases pain associated with gas.   If constipation develops:  You may take Colace (stool softener), Milk of Magnesia, Dulcolax or Miralax.  All of these medications are sold over the counter.      Incision Care:   Clean your incision with mild soap & warm water only- do not scrub- let warm water run over it, then pat dry.      Pain Relief:  You may take Motrin for mild pain & uterine cramping.      Emotional Changes:  You may experience baby blues after delivery.  You may feel let down, anxious and cry easily.  This is normal.  These feelings can begin 2-3 days after delivery and usually disappear in about a week or two.  Prolonged sadness may indicate postpartum depression.      Call your doctor for any of the following:  Difficulty breathing, problems with any of your medications, inability to eat.    Foul smelling vaginal discharge.  If you notice pus-like drainage from your incision, if your incision or the area around it becomes hot or swollen, or if you notice a foul smelling odor.  Temperature above 100.4.    Heavy vaginal bleeding.  All women bleed different after delivery and each delivery is different.  Heavy bleeding consists of  saturating a jose pad in a 1 hour time period.  Passing clots are normal, if you pass a blood clot larger than the size of a golf ball call your doctor's office.   If you experience pain in your legs/calves, if one leg increases in size and becomes swollen or becomes hot to touch or discolored.   Crying or periods of sadness beyond 2 weeks.      If you are breast feeding:  Wash your breasts with mild soap and warm water.  You should wear a supportive bra.  You should continue to take a prenatal vitamin for 6 weeks or until breastfeeding is discontinued.  If nipples are sore, apply a few drops of breast milk after nursing and let air dry or you can use Lanolin cream.   If breasts are engorged, apply warmth and express milk.    If you are not breastfeeding:  Wear a tight bra and do not stimulate your breasts.  Avoid handling your breasts and do not express milk.  You may apply ice packs or cabbage leaves to relieve discomfort from engorgement.  If your breasts become warm to touch, reddened or lumps develop call your doctor.

## 2024-01-05 NOTE — LACTATION NOTE
01/04/24 1200   Maternal Assessment   Breast Density Bilateral:;full   Areola Bilateral:;elastic   Nipples Bilateral:;everted   Equipment Type   Breast Pump Type double electric, hospital grade   Breast Pump Flange Type hard   Breast Pump Flange Size 24 mm   Breast Pumping   Breast Pumping Interventions frequent pumping encouraged   Breast Pumping double electric breast pump utilized;bilateral breasts pumped until soft     Reviewed NICU pumping discharge instructions and patient provided with LALITO pump # [id number] for use while baby in NICU. Patient verbalizes understanding of all instructions with good recall.

## 2024-01-18 ENCOUNTER — PATIENT MESSAGE (OUTPATIENT)
Dept: LACTATION | Facility: HOSPITAL | Age: 39
End: 2024-01-18

## 2024-04-01 PROBLEM — O13.3 GESTATIONAL HYPERTENSION, THIRD TRIMESTER: Status: RESOLVED | Noted: 2023-11-20 | Resolved: 2024-04-01

## 2024-04-01 PROBLEM — O60.03 PRETERM LABOR IN THIRD TRIMESTER: Status: RESOLVED | Noted: 2023-12-31 | Resolved: 2024-04-01

## 2024-04-27 ENCOUNTER — HOSPITAL ENCOUNTER (EMERGENCY)
Facility: HOSPITAL | Age: 39
Discharge: HOME | End: 2024-04-27
Attending: EMERGENCY MEDICINE
Payer: COMMERCIAL

## 2024-04-27 VITALS
DIASTOLIC BLOOD PRESSURE: 86 MMHG | RESPIRATION RATE: 18 BRPM | HEIGHT: 62 IN | OXYGEN SATURATION: 97 % | BODY MASS INDEX: 27.79 KG/M2 | SYSTOLIC BLOOD PRESSURE: 133 MMHG | TEMPERATURE: 99 F | WEIGHT: 151 LBS | HEART RATE: 82 BPM

## 2024-04-27 DIAGNOSIS — R07.9 CHEST PAIN, UNSPECIFIED TYPE: Primary | ICD-10-CM

## 2024-04-27 DIAGNOSIS — R07.9 CHEST PAIN: ICD-10-CM

## 2024-04-27 LAB
ALBUMIN SERPL BCP-MCNC: 4.9 G/DL (ref 3.5–5.2)
ALP SERPL-CCNC: 96 U/L (ref 55–135)
ALT SERPL W/O P-5'-P-CCNC: 28 U/L (ref 10–44)
ANION GAP SERPL CALC-SCNC: 7 MMOL/L (ref 8–16)
AST SERPL-CCNC: 22 U/L (ref 10–40)
B-HCG UR QL: NEGATIVE
BASOPHILS # BLD AUTO: 0.07 K/UL (ref 0–0.2)
BASOPHILS NFR BLD: 1.6 % (ref 0–1.9)
BILIRUB SERPL-MCNC: 0.4 MG/DL (ref 0.1–1)
BNP SERPL-MCNC: 24 PG/ML (ref 0–99)
BUN SERPL-MCNC: 16 MG/DL (ref 6–20)
CALCIUM SERPL-MCNC: 9.6 MG/DL (ref 8.7–10.5)
CHLORIDE SERPL-SCNC: 103 MMOL/L (ref 95–110)
CO2 SERPL-SCNC: 30 MMOL/L (ref 23–29)
CREAT SERPL-MCNC: 0.9 MG/DL (ref 0.5–1.4)
CTP QC/QA: YES
D DIMER PPP IA.FEU-MCNC: 0.94 MG/L FEU (ref 0–0.49)
DIFFERENTIAL METHOD BLD: ABNORMAL
EOSINOPHIL # BLD AUTO: 0.6 K/UL (ref 0–0.5)
EOSINOPHIL NFR BLD: 13.8 % (ref 0–8)
ERYTHROCYTE [DISTWIDTH] IN BLOOD BY AUTOMATED COUNT: 12.6 % (ref 11.5–14.5)
EST. GFR  (NO RACE VARIABLE): >60 ML/MIN/1.73 M^2
GLUCOSE SERPL-MCNC: 88 MG/DL (ref 70–110)
HCT VFR BLD AUTO: 46.9 % (ref 37–48.5)
HGB BLD-MCNC: 14.5 G/DL (ref 12–16)
IMM GRANULOCYTES # BLD AUTO: 0 K/UL (ref 0–0.04)
IMM GRANULOCYTES NFR BLD AUTO: 0 % (ref 0–0.5)
LYMPHOCYTES # BLD AUTO: 1.7 K/UL (ref 1–4.8)
LYMPHOCYTES NFR BLD: 38.2 % (ref 18–48)
MAGNESIUM SERPL-MCNC: 2 MG/DL (ref 1.6–2.6)
MCH RBC QN AUTO: 28.8 PG (ref 27–31)
MCHC RBC AUTO-ENTMCNC: 30.9 G/DL (ref 32–36)
MCV RBC AUTO: 93 FL (ref 82–98)
MONOCYTES # BLD AUTO: 0.3 K/UL (ref 0.3–1)
MONOCYTES NFR BLD: 6 % (ref 4–15)
NEUTROPHILS # BLD AUTO: 1.8 K/UL (ref 1.8–7.7)
NEUTROPHILS NFR BLD: 40.4 % (ref 38–73)
NRBC BLD-RTO: 0 /100 WBC
OHS QRS DURATION: 80 MS
OHS QTC CALCULATION: 433 MS
PLATELET # BLD AUTO: 218 K/UL (ref 150–450)
PMV BLD AUTO: 9.7 FL (ref 9.2–12.9)
POTASSIUM SERPL-SCNC: 3.7 MMOL/L (ref 3.5–5.1)
PROT SERPL-MCNC: 8.5 G/DL (ref 6–8.4)
RBC # BLD AUTO: 5.03 M/UL (ref 4–5.4)
SODIUM SERPL-SCNC: 140 MMOL/L (ref 136–145)
TROPONIN I SERPL HS-MCNC: 2.7 PG/ML (ref 0–14.9)
TROPONIN I SERPL HS-MCNC: 3.4 PG/ML (ref 0–14.9)
WBC # BLD AUTO: 4.5 K/UL (ref 3.9–12.7)

## 2024-04-27 PROCEDURE — 25500020 PHARM REV CODE 255: Performed by: NURSE PRACTITIONER

## 2024-04-27 PROCEDURE — 96374 THER/PROPH/DIAG INJ IV PUSH: CPT

## 2024-04-27 PROCEDURE — 80053 COMPREHEN METABOLIC PANEL: CPT | Performed by: PHYSICIAN ASSISTANT

## 2024-04-27 PROCEDURE — 83880 ASSAY OF NATRIURETIC PEPTIDE: CPT | Performed by: PHYSICIAN ASSISTANT

## 2024-04-27 PROCEDURE — 81025 URINE PREGNANCY TEST: CPT | Performed by: NURSE PRACTITIONER

## 2024-04-27 PROCEDURE — 85379 FIBRIN DEGRADATION QUANT: CPT | Performed by: PHYSICIAN ASSISTANT

## 2024-04-27 PROCEDURE — 99285 EMERGENCY DEPT VISIT HI MDM: CPT | Mod: 25

## 2024-04-27 PROCEDURE — 84484 ASSAY OF TROPONIN QUANT: CPT | Mod: 91 | Performed by: PHYSICIAN ASSISTANT

## 2024-04-27 PROCEDURE — 83735 ASSAY OF MAGNESIUM: CPT | Performed by: PHYSICIAN ASSISTANT

## 2024-04-27 PROCEDURE — 85025 COMPLETE CBC W/AUTO DIFF WBC: CPT | Performed by: PHYSICIAN ASSISTANT

## 2024-04-27 PROCEDURE — 63600175 PHARM REV CODE 636 W HCPCS: Performed by: NURSE PRACTITIONER

## 2024-04-27 PROCEDURE — 36415 COLL VENOUS BLD VENIPUNCTURE: CPT | Performed by: PHYSICIAN ASSISTANT

## 2024-04-27 RX ORDER — KETOROLAC TROMETHAMINE 30 MG/ML
10 INJECTION, SOLUTION INTRAMUSCULAR; INTRAVENOUS
Status: COMPLETED | OUTPATIENT
Start: 2024-04-27 | End: 2024-04-27

## 2024-04-27 RX ADMIN — IOHEXOL 100 ML: 350 INJECTION, SOLUTION INTRAVENOUS at 09:04

## 2024-04-27 RX ADMIN — KETOROLAC TROMETHAMINE 10 MG: 30 INJECTION, SOLUTION INTRAMUSCULAR; INTRAVENOUS at 11:04

## 2024-04-27 NOTE — Clinical Note
"Lorena Avilalonny Green was seen and treated in our emergency department on 4/27/2024.  She may return to work on 04/29/2024.       If you have any questions or concerns, please don't hesitate to call.      Cathleen Leon NP"

## 2024-04-27 NOTE — FIRST PROVIDER EVALUATION
Emergency Department TeleTriage Encounter Note      CHIEF COMPLAINT    Chief Complaint   Patient presents with    Chest Pain     ONSET TODAY       VITAL SIGNS   Initial Vitals [04/27/24 1253]   BP Pulse Resp Temp SpO2   (!) 185/97 (!) 53 16 98.6 °F (37 °C) 100 %      MAP       --            ALLERGIES    Review of patient's allergies indicates:  No Known Allergies    PROVIDER TRIAGE NOTE  Patient presents with constant right chest pain worse with inspiration. No recent cough, leg pain or swelling. Family history of blood clots, but no personal history.       ORDERS  Labs Reviewed - No data to display    ED Orders (720h ago, onward)      Start Ordered     Status Ordering Provider    04/27/24 1251 04/27/24 1250  EKG 12-lead  Once         In process MONICA TEMPLETON              Virtual Visit Note: The provider triage portion of this emergency department evaluation and documentation was performed via [employer], a HIPAA-compliant telemedicine application, in concert with a tele-presenter in the room. A face to face patient evaluation with one of my colleagues will occur once the patient is placed in an emergency department room.      DISCLAIMER: This note was prepared with [employer] voice recognition transcription software. Garbled syntax, mangled pronouns, and other bizarre constructions may be attributed to that software system.

## 2024-04-29 NOTE — ED PROVIDER NOTES
Encounter Date: 2024       History     Chief Complaint   Patient presents with    Chest Pain     ONSET TODAY     Lorena Green is a 38 year old female with pmh anemia, GERD presenting to the ED with c/o right sided chest pain. She states pain began at approximately 12:30 today and was described as a squeezing pain that increased with inspiration. She has had no SOB and pain has been constant since onset. No upper respiratory symptoms or fever. No personal pmh of PE/DVT but family history. Denies clotting/bleeding disorders, leg swelling.       Review of patient's allergies indicates:  No Known Allergies  Past Medical History:   Diagnosis Date    Anemia     GERD (gastroesophageal reflux disease)     Gestational hypertension      Past Surgical History:   Procedure Laterality Date     SECTION N/A 2023    Procedure:  SECTION;  Surgeon: Vinita Ac MD;  Location: [facility] L&D;  Service: OB/GYN;  Laterality: N/A;    CYSTECTOMY      right breast cyst removal in  as baby     No family history on file.  Social History     Tobacco Use    Smoking status: Never   Substance Use Topics    Alcohol use: Not Currently     Comment: rarely    Drug use: Never     Review of Systems   Constitutional:  Negative for fever.   HENT:  Negative for sore throat.    Respiratory:  Positive for chest tightness. Negative for shortness of breath.    Cardiovascular:  Negative for chest pain.   Gastrointestinal:  Negative for nausea.   Genitourinary:  Negative for dysuria.   Musculoskeletal:  Negative for back pain.   Skin:  Negative for rash.   Neurological:  Negative for weakness.   Hematological:  Does not bruise/bleed easily.       Physical Exam     Initial Vitals [24 1253]   BP Pulse Resp Temp SpO2   (!) 185/97 (!) 53 16 98.6 °F (37 °C) 100 %      MAP       --         Physical Exam    Nursing note and vitals reviewed.  Constitutional: She appears well-developed and well-nourished. She is not  diaphoretic. No distress.   HENT:   Head: Normocephalic and atraumatic.   Mouth/Throat: Oropharynx is clear and moist.   Eyes: Conjunctivae are normal.   Neck: Neck supple.   Cardiovascular:  Normal rate, regular rhythm, normal heart sounds and intact distal pulses.     Exam reveals no gallop and no friction rub.       No murmur heard.  Pulmonary/Chest: Breath sounds normal. No respiratory distress. She has no wheezes. She has no rhonchi. She has no rales. She exhibits tenderness.   Abdominal: Abdomen is soft. She exhibits no distension. There is no abdominal tenderness.   Musculoskeletal:         General: Normal range of motion.      Cervical back: Neck supple.     Neurological: She is alert and oriented to person, place, and time.   Skin: No rash noted. No erythema.   Psychiatric: Her speech is normal.         ED Course   Procedures  Labs Reviewed   CBC W/ AUTO DIFFERENTIAL - Abnormal; Notable for the following components:       Result Value    MCHC 30.9 (*)     Eos # 0.6 (*)     Eosinophil % 13.8 (*)     All other components within normal limits   COMPREHENSIVE METABOLIC PANEL - Abnormal; Notable for the following components:    CO2 30 (*)     Total Protein 8.5 (*)     Anion Gap 7 (*)     All other components within normal limits   D DIMER, QUANTITATIVE - Abnormal; Notable for the following components:    D-Dimer 0.94 (*)     All other components within normal limits    Narrative:     D DIMER   critical result(s) called and verbal readback obtained from   JAS HANKINS RN ER. by TC1 04/27/2024 18:44   MAGNESIUM   TROPONIN I HIGH SENSITIVITY   TROPONIN I HIGH SENSITIVITY   B-TYPE NATRIURETIC PEPTIDE   D DIMER, QUANTITATIVE   POCT URINE PREGNANCY        ECG Results              EKG 12-lead (In process)        Collection Time Result Time QRS Duration OHS QTC Calculation    04/27/24 12:52:07 04/27/24 13:00:49 80 433                     In process by Interface, Lab In [facility] (04/27/24 13:00:51)                    Narrative:    Test Reason : R07.9,    Vent. Rate : 052 BPM     Atrial Rate : 052 BPM     P-R Int : 148 ms          QRS Dur : 080 ms      QT Int : 466 ms       P-R-T Axes : 025 058 049 degrees     QTc Int : 433 ms    Sinus bradycardia  Otherwise normal ECG  No previous ECGs available    Referred By:             Confirmed By:                                   Imaging Results              CTA Chest Non-Coronary (PE Studies) (Final result)  Result time 04/27/24 21:54:03      Final result by Omar Cardenas MD (04/27/24 21:54:03)                   Impression:      1. No evidence of acute pulmonary thromboembolism.  2. No acute findings elsewhere in the chest.      Electronically signed by: Omar Cardenas  Date:    04/27/2024  Time:    21:54               Narrative:    EXAMINATION:  CTA CHEST NON CORONARY (PE STUDIES)    CLINICAL HISTORY:  Pulmonary embolism (PE) suspected, positive D-dimer;    TECHNIQUE:  Following the intravenous administration of 75 cc of Omnipaque 350 contrast material, 1.25 mm contiguous axial images were acquired through the chest utilizing the CT pulmonary angiogram protocol.  2-D coronal and sagittal reconstructed images of the chest and sagittal oblique MIP images of the pulmonary arterial system were generated from the source data.    COMPARISON:  None.    FINDINGS:  Pulmonary arterial system: This is a good quality examination for the evaluation of pulmonary thromboembolism with good opacification of the pulmonary arteries to the level of the segmental branches of the pulmonary arterial system. There is no evidence of acute pulmonary thromboembolism. No large saddle pulmonary embolism, enlargement of the main pulmonary artery, or secondary findings of right ventricular strain.    Aorta and heart: The heart is normal in size.  No pericardial fluid.  No thoracic aortic aneurysm.  No thoracic aortic dissection.    Airways: Unremarkable.    Lymph nodes: No pathologically enlarged lymph nodes  in the chest or axilla.    Lungs: The lungs are clear with no evidence of airspace consolidation, mass, or bronchiectasis.  No emphysematous lung architecture.    Pleura: No significant volume of pleural fluid or pneumothorax.  No pleural based masses.    Visualized upper abdominal soft tissues: No significant abnormalities appreciated.    Bones: There is degenerative change of the thoracic spine.                                       X-Ray Chest AP Portable (Final result)  Result time 04/27/24 14:38:43      Final result by Dave Tim MD (04/27/24 14:38:43)                   Impression:      No acute pulmonary process.      Electronically signed by: Dave Tim  Date:    04/27/2024  Time:    14:38               Narrative:    EXAMINATION:  XR CHEST AP PORTABLE    CLINICAL HISTORY:  Chest Pain;    COMPARISON:  10/21/2017    FINDINGS:  Cardiomediastinal silhouette is within normal limits. There is no confluent airspace disease. There is no pleural effusion or pneumothorax. No acute osseous abnormality.                                       Medications   iohexoL (OMNIPAQUE 350) injection 100 mL (100 mLs Intravenous Given 4/27/24 2124)   ketorolac injection 9.999 mg (9.999 mg Intravenous Given 4/27/24 2317)     Medical Decision Making  This is an urgent evaluation of a 38 year old female presenting to the ED with c/o pleuritic chest pain. D-dimer was elevated at 0.94 so PE study performed. CTA ordered and reviewed with radiology report as follows: No evidence of acute pulmonary thromboembolism.  Serial troponins also done in ED with results as follows:   04/27/24 14:07  Troponin I High Sensitivity: 3.4    04/27/24 16:43  Troponin I High Sensitivity: 2.7  CXR ordered and reviewed and shows no acute intrathoracic process.   EKG interpretation sinus bradycardia with rate of 52. No acute ischemic changes noted.   Symptoms not consistent with ACS, PE, pneumothorax. Do not suspect pericarditis or myocarditis. Likely  musculoskeletal- will have her follow up with PCP and return to the ED for any worsening symptoms. Based on my clinical evaluation, I do not appreciate any immediate, emergent, or life threatening condition or etiology that warrants additional workup today and feel that the patient can be discharged with close follow up care.         Amount and/or Complexity of Data Reviewed  Labs: ordered. Decision-making details documented in ED Course.  Radiology: ordered. Decision-making details documented in ED Course.  ECG/medicine tests: ordered. Decision-making details documented in ED Course.    Risk  Prescription drug management.               ED Course as of 04/28/24 2217   Sat Apr 27, 2024   6047 Attending Note: I discussed the patient's care with Advanced Practice Clinician.  I reviewed their note and agree with the history, physical, assessment, diagnosis, treatment, all procedures performed, xray and EKG interpretations and discharge plan provided by the Advanced Practice Clinician. My overall impression is right-sided chest wall pain.  The patient has been instructed to follow up with their physician or the one provided as well as specific return precautions.   Rita Bowie M.D. 4/27/2024 11:25 PM     [RM]      ED Course User Index  [RM] Rita Bowie MD                           Clinical Impression:  Final diagnoses:  [R07.9] Chest pain  [R07.9] Chest pain, unspecified type (Primary)          ED Disposition Condition    Discharge Stable          ED Prescriptions    None       Follow-up Information       Follow up With Specialties Details Why Contact Info Additional Information    [facility] - Emergency Dept Emergency Medicine  As needed, If symptoms worsen [address]  [facility] [address]  [phone] 1st floor             Cathleen Leon NP  04/28/24 [id number]

## (undated) DEVICE — ADHESIVE TISSUE DERMAFLEX WITH TIP

## (undated) DEVICE — AGENT HEMOSTATIC ARISTA 3GR